# Patient Record
Sex: FEMALE | Race: BLACK OR AFRICAN AMERICAN | NOT HISPANIC OR LATINO | Employment: PART TIME | RURAL
[De-identification: names, ages, dates, MRNs, and addresses within clinical notes are randomized per-mention and may not be internally consistent; named-entity substitution may affect disease eponyms.]

---
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
## 2024-01-29 ENCOUNTER — ROUTINE PRENATAL (OUTPATIENT)
Dept: OBSTETRICS AND GYNECOLOGY | Facility: CLINIC | Age: 19
End: 2024-01-29
Payer: MEDICAID

## 2024-01-29 VITALS
BODY MASS INDEX: 23.34 KG/M2 | SYSTOLIC BLOOD PRESSURE: 120 MMHG | DIASTOLIC BLOOD PRESSURE: 88 MMHG | WEIGHT: 136 LBS | HEART RATE: 98 BPM

## 2024-01-29 DIAGNOSIS — Z34.82 ENCOUNTER FOR SUPERVISION OF NORMAL PREGNANCY IN MULTIGRAVIDA IN SECOND TRIMESTER: Primary | ICD-10-CM

## 2024-01-29 DIAGNOSIS — R35.0 URINARY FREQUENCY: ICD-10-CM

## 2024-01-29 DIAGNOSIS — Z3A.18 18 WEEKS GESTATION OF PREGNANCY: ICD-10-CM

## 2024-01-29 LAB
BILIRUB SERPL-MCNC: NORMAL MG/DL
BLOOD URINE, POC: NORMAL
COLOR, POC UA: NORMAL
GLUCOSE UR QL STRIP: NORMAL
KETONES UR QL STRIP: NORMAL
LEUKOCYTE ESTERASE URINE, POC: NORMAL
NITRITE, POC UA: NORMAL
PH, POC UA: 7
PROTEIN, POC: NORMAL
SPECIFIC GRAVITY, POC UA: 1.01
UROBILINOGEN, POC UA: 0.2

## 2024-01-29 PROCEDURE — 81003 URINALYSIS AUTO W/O SCOPE: CPT | Mod: PBBFAC | Performed by: OBSTETRICS & GYNECOLOGY

## 2024-01-29 PROCEDURE — 99213 OFFICE O/P EST LOW 20 MIN: CPT | Mod: S$PBB,TH,, | Performed by: OBSTETRICS & GYNECOLOGY

## 2024-01-29 PROCEDURE — 99213 OFFICE O/P EST LOW 20 MIN: CPT | Mod: PBBFAC | Performed by: OBSTETRICS & GYNECOLOGY

## 2024-01-29 PROCEDURE — 99999PBSHW POCT URINALYSIS W/O SCOPE: Mod: PBBFAC,,,

## 2024-01-29 NOTE — PROGRESS NOTES
18 y.o. female  at 18w0d   She c/o nothing today. She is doing well.   Reports some fetal movement or fluttering. Denies any vaginal bleeding, leakage of fluid, cramping, contractions, or pressure.   Total weight gain/weight loss in pregnancy: 2.268 kg (5 lb)     Vitals  BP: 120/88  Pulse: 98  Weight: 61.7 kg (136 lb)  Prenatal  Fetal Heart Rate: 145  Movement: Present  Edema  LLE Edema: None  RLE Edema: None  Facial: None  Additional Edema?: No    Prenatal Labs:  Lab Results   Component Value Date    GROUPTRH O POS 2023    HGB 11.4 (L) 2023    HCT 34.0 (L) 2023     2023    SICKLE Negative 2023    HEPBSAG Non-Reactive 2023    [id number] Non-Reactive 2023    LABNGO Negative 2023    LABURIN Skin/Urogenital Carol Isolated, no further workup. 2023    [id number] Negative 2021       A: 18w0d           ICD-10-CM ICD-9-CM    1. Encounter for supervision of normal pregnancy in multigravida in second trimester  Z34.82 V22.1       2. Urinary frequency  R35.0 788.41       3. 18 weeks gestation of pregnancy  Z3A.18 V22.2 POCT URINALYSIS W/O SCOPE          P: Bleeding, daily fetal kick counts, and  labor/labor precautions discussed.      Questions answered to desired level of satisfaction  Verbalized understanding to all information and instructions provided.  Follow up for 2 weeks anatomy US and 4 weeks YESSICA visit.

## 2024-02-12 ENCOUNTER — HOSPITAL ENCOUNTER (OUTPATIENT)
Dept: RADIOLOGY | Facility: HOSPITAL | Age: 19
Discharge: HOME | End: 2024-02-12
Attending: OBSTETRICS & GYNECOLOGY
Payer: MEDICAID

## 2024-02-12 DIAGNOSIS — Z36.89 SCREENING FOR PREGNANCY-ASSOCIATED PLASMA PROTEIN A: ICD-10-CM

## 2024-02-12 PROCEDURE — 76805 OB US >/= 14 WKS SNGL FETUS: CPT | Mod: 26,,, | Performed by: RADIOLOGY

## 2024-02-12 PROCEDURE — 76805 OB US >/= 14 WKS SNGL FETUS: CPT | Mod: TC

## 2024-02-13 ENCOUNTER — TELEPHONE (OUTPATIENT)
Dept: OBSTETRICS AND GYNECOLOGY | Facility: CLINIC | Age: 19
End: 2024-02-13
Payer: MEDICAID

## 2024-02-26 ENCOUNTER — ROUTINE PRENATAL (OUTPATIENT)
Dept: OBSTETRICS AND GYNECOLOGY | Facility: CLINIC | Age: 19
End: 2024-02-26
Payer: MEDICAID

## 2024-02-26 VITALS
DIASTOLIC BLOOD PRESSURE: 80 MMHG | BODY MASS INDEX: 23.69 KG/M2 | SYSTOLIC BLOOD PRESSURE: 120 MMHG | HEART RATE: 61 BPM | WEIGHT: 138 LBS

## 2024-02-26 DIAGNOSIS — O44.42 LOW LYING PLACENTA NOS OR WITHOUT HEMORRHAGE, SECOND TRIMESTER: ICD-10-CM

## 2024-02-26 DIAGNOSIS — R35.0 URINARY FREQUENCY: ICD-10-CM

## 2024-02-26 DIAGNOSIS — Z3A.22 22 WEEKS GESTATION OF PREGNANCY: Primary | ICD-10-CM

## 2024-02-26 DIAGNOSIS — R42 DIZZINESS: ICD-10-CM

## 2024-02-26 DIAGNOSIS — Z34.82 ENCOUNTER FOR SUPERVISION OF NORMAL PREGNANCY IN MULTIGRAVIDA IN SECOND TRIMESTER: ICD-10-CM

## 2024-02-26 LAB
BILIRUB SERPL-MCNC: NORMAL MG/DL
BLOOD URINE, POC: NORMAL
COLOR, POC UA: NORMAL
GLUCOSE UR QL STRIP: NORMAL
KETONES UR QL STRIP: NORMAL
LEUKOCYTE ESTERASE URINE, POC: NORMAL
NITRITE, POC UA: NORMAL
PH, POC UA: 7.5
PROTEIN, POC: NORMAL
SPECIFIC GRAVITY, POC UA: 1.01
UROBILINOGEN, POC UA: 0.2

## 2024-02-26 PROCEDURE — 99999PBSHW POCT URINALYSIS W/O SCOPE: Mod: PBBFAC,,,

## 2024-02-26 PROCEDURE — 59425 ANTEPARTUM CARE ONLY: CPT | Mod: S$PBB,TH,, | Performed by: OBSTETRICS & GYNECOLOGY

## 2024-02-26 PROCEDURE — 99213 OFFICE O/P EST LOW 20 MIN: CPT | Mod: PBBFAC | Performed by: OBSTETRICS & GYNECOLOGY

## 2024-02-26 PROCEDURE — 81003 URINALYSIS AUTO W/O SCOPE: CPT | Mod: PBBFAC | Performed by: OBSTETRICS & GYNECOLOGY

## 2024-02-26 NOTE — PROGRESS NOTES
18 y.o. female  at 22w0d   She c/o feeling dizziness on standing mainly at work during the day. She feels like her iron is low. She works at [employer] about 6 days a week at 9-10 hour shifts. She would like to reduce her work hours.  She is only drinking about 2-3 16 oz water bottles a day. Discussed increasing her water intake. She does admit to drinking a lot of sweet tea. Discussed drinking extra water with this.  Also increase protein intake and make sure to eat 3 meals a day.   Discussed dizziness may be a hydration issue and a glycemic issue.  Discussed anatomy US was wnl, but placental was low lying. Plan repeat US next visit  Reports good fetal movement or fluttering. Denies any vaginal bleeding, leakage of fluid, cramping, contractions, or pressure.   Total weight gain/weight loss in pregnancy: 3.175 kg (7 lb)     Vitals  BP: 120/80  Pulse: 61  Weight: 62.6 kg (138 lb)  Prenatal  Fundal Height (cm): 22 cm  Fetal Heart Rate: 140  Movement: Present  Edema  LLE Edema: None  RLE Edema: None  Facial: None  Additional Edema?: No    Prenatal Labs:  Lab Results   Component Value Date    GROUPTRH O POS 2023    HGB 11.4 (L) 2023    HCT 34.0 (L) 2023     2023    SICKLE Negative 2023    HEPBSAG Non-Reactive 2023    [id number] Non-Reactive 2023    LABNGO Negative 2023    LABURIN Skin/Urogenital Carol Isolated, no further workup. 2023    [id number] Negative 2021       A: 22w0d           ICD-10-CM ICD-9-CM    1. 22 weeks gestation of pregnancy  Z3A.22 V22.2 POCT URINALYSIS W/O SCOPE      2. Encounter for supervision of normal pregnancy in multigravida in second trimester  Z34.82 V22.1       3. Urinary frequency  R35.0 788.41 POCT URINALYSIS W/O SCOPE      4. Low lying placenta nos or without hemorrhage, second trimester  O44.42 641.03 US OB FU Ea Gestation Transabdominal      5. Dizziness  R42 780.4 CBC Auto Differential      Basic Metabolic  Panel          P: Bleeding, daily fetal kick counts, and  labor/labor precautions discussed.    Note given to work no more than 5 days a week and 6 hour shifts.  Questions answered to desired level of satisfaction  Verbalized understanding to all information and instructions provided.  Follow up in about 4 weeks (around 3/25/2024) for YESSICA and repeat US.

## 2024-03-01 ENCOUNTER — TELEPHONE (OUTPATIENT)
Dept: OBSTETRICS AND GYNECOLOGY | Facility: CLINIC | Age: 19
End: 2024-03-01
Payer: MEDICAID

## 2024-03-01 NOTE — TELEPHONE ENCOUNTER
----- Message from Maximo Penaloza MD sent at 2/29/2024  3:01 AM CST -----  Please call her and tell her that she is anemic and she can take an iron supplement over the counter (ferrous sulfate 325 mg daily).

## 2024-03-08 ENCOUNTER — HOSPITAL ENCOUNTER (EMERGENCY)
Facility: HOSPITAL | Age: 19
Discharge: HOME | End: 2024-03-08
Payer: MEDICAID

## 2024-03-08 VITALS
HEART RATE: 72 BPM | RESPIRATION RATE: 18 BRPM | HEIGHT: 64 IN | OXYGEN SATURATION: 100 % | TEMPERATURE: 99 F | WEIGHT: 145 LBS | BODY MASS INDEX: 24.75 KG/M2 | SYSTOLIC BLOOD PRESSURE: 119 MMHG | DIASTOLIC BLOOD PRESSURE: 73 MMHG

## 2024-03-08 DIAGNOSIS — N39.0 URINARY TRACT INFECTION WITH HEMATURIA, SITE UNSPECIFIED: ICD-10-CM

## 2024-03-08 DIAGNOSIS — E87.1 HYPONATREMIA: ICD-10-CM

## 2024-03-08 DIAGNOSIS — O46.90 VAGINAL BLEEDING IN PREGNANCY: Primary | ICD-10-CM

## 2024-03-08 DIAGNOSIS — R31.9 URINARY TRACT INFECTION WITH HEMATURIA, SITE UNSPECIFIED: ICD-10-CM

## 2024-03-08 DIAGNOSIS — Z86.2 HX OF IRON DEFICIENCY ANEMIA: ICD-10-CM

## 2024-03-08 DIAGNOSIS — A59.9 TRICHOMONAS INFECTION: ICD-10-CM

## 2024-03-08 LAB
ANION GAP SERPL CALCULATED.3IONS-SCNC: 11 MMOL/L (ref 7–16)
BASOPHILS # BLD AUTO: 0.04 K/UL (ref 0–0.2)
BASOPHILS NFR BLD AUTO: 0.4 % (ref 0–1)
BILIRUB UR QL STRIP: NEGATIVE
BUN SERPL-MCNC: 10 MG/DL (ref 7–18)
BUN/CREAT SERPL: 20 (ref 6–20)
CALCIUM SERPL-MCNC: 8.6 MG/DL (ref 8.5–10.1)
CHLORIDE SERPL-SCNC: 101 MMOL/L (ref 98–107)
CLARITY UR: ABNORMAL
CO2 SERPL-SCNC: 25 MMOL/L (ref 21–32)
COLOR UR: YELLOW
CREAT SERPL-MCNC: 0.5 MG/DL (ref 0.55–1.02)
DIFFERENTIAL METHOD BLD: ABNORMAL
EGFR (NO RACE VARIABLE) (RUSH/TITUS): 139 ML/MIN/1.73M2
EOSINOPHIL # BLD AUTO: 0.21 K/UL (ref 0–0.5)
EOSINOPHIL NFR BLD AUTO: 2.4 % (ref 1–4)
ERYTHROCYTE [DISTWIDTH] IN BLOOD BY AUTOMATED COUNT: 12.9 % (ref 11.5–14.5)
GLUCOSE SERPL-MCNC: 102 MG/DL (ref 74–106)
GLUCOSE UR STRIP-MCNC: NEGATIVE MG/DL
GROUP & RH: NORMAL
HCT VFR BLD AUTO: 28 % (ref 38–47)
HGB BLD-MCNC: 9 G/DL (ref 12–16)
INDIRECT COOMBS: NEGATIVE
KETONES UR STRIP-SCNC: NEGATIVE MG/DL
LEUKOCYTE ESTERASE UR QL STRIP: ABNORMAL
LYMPHOCYTES # BLD AUTO: 2.09 K/UL (ref 1–4.8)
LYMPHOCYTES NFR BLD AUTO: 23.5 % (ref 27–41)
MCH RBC QN AUTO: 28.5 PG (ref 27–31)
MCHC RBC AUTO-ENTMCNC: 32.1 G/DL (ref 32–36)
MCV RBC AUTO: 88.6 FL (ref 80–96)
MONOCYTES # BLD AUTO: 1.01 K/UL (ref 0–0.8)
MONOCYTES NFR BLD AUTO: 11.3 % (ref 2–6)
MPC BLD CALC-MCNC: 9.6 FL (ref 9.4–12.4)
MUCOUS THREADS #/AREA URNS HPF: ABNORMAL /HPF
NEUTROPHILS # BLD AUTO: 5.56 K/UL (ref 1.8–7.7)
NEUTROPHILS NFR BLD AUTO: 62.4 % (ref 53–65)
NITRITE UR QL STRIP: NEGATIVE
PH UR STRIP: 6.5 PH UNITS
PLATELET # BLD AUTO: 358 K/UL (ref 150–400)
POTASSIUM SERPL-SCNC: 3.6 MMOL/L (ref 3.5–5.1)
PROT UR QL STRIP: ABNORMAL
RBC # BLD AUTO: 3.16 M/UL (ref 4.2–5.4)
RBC # UR STRIP: ABNORMAL /UL
RBC #/AREA URNS HPF: ABNORMAL /HPF
SODIUM SERPL-SCNC: 133 MMOL/L (ref 136–145)
SP GR UR STRIP: >=1.03
SPECIMEN OUTDATE: NORMAL
SQUAMOUS #/AREA URNS LPF: ABNORMAL /LPF
TRICHOMONAS #/AREA URNS HPF: ABNORMAL /HPF
UROBILINOGEN UR STRIP-ACNC: 2 MG/DL
WBC # BLD AUTO: 8.91 K/UL (ref 4.5–11)
WBC #/AREA URNS HPF: ABNORMAL /HPF

## 2024-03-08 PROCEDURE — 80048 BASIC METABOLIC PNL TOTAL CA: CPT

## 2024-03-08 PROCEDURE — 96365 THER/PROPH/DIAG IV INF INIT: CPT

## 2024-03-08 PROCEDURE — 81003 URINALYSIS AUTO W/O SCOPE: CPT

## 2024-03-08 PROCEDURE — 99284 EMERGENCY DEPT VISIT MOD MDM: CPT | Mod: ,,,

## 2024-03-08 PROCEDURE — 99284 EMERGENCY DEPT VISIT MOD MDM: CPT | Mod: 25

## 2024-03-08 PROCEDURE — 87086 URINE CULTURE/COLONY COUNT: CPT

## 2024-03-08 PROCEDURE — 63600175 PHARM REV CODE 636 W HCPCS

## 2024-03-08 PROCEDURE — 85025 COMPLETE CBC W/AUTO DIFF WBC: CPT

## 2024-03-08 PROCEDURE — 86900 BLOOD TYPING SEROLOGIC ABO: CPT

## 2024-03-08 PROCEDURE — 25000003 PHARM REV CODE 250

## 2024-03-08 PROCEDURE — 86901 BLOOD TYPING SEROLOGIC RH(D): CPT

## 2024-03-08 PROCEDURE — 86850 RBC ANTIBODY SCREEN: CPT

## 2024-03-08 RX ORDER — CEPHALEXIN 500 MG/1
500 CAPSULE ORAL 4 TIMES DAILY
Qty: 28 CAPSULE | Refills: 0 | Status: SHIPPED | OUTPATIENT
Start: 2024-03-08 | End: 2024-03-15

## 2024-03-08 RX ORDER — METRONIDAZOLE 500 MG/1
500 TABLET ORAL EVERY 12 HOURS
Qty: 14 TABLET | Refills: 0 | Status: SHIPPED | OUTPATIENT
Start: 2024-03-08 | End: 2024-03-15

## 2024-03-08 RX ORDER — VITAMIN- MINERAL OMEGA-3 SUPPLEMENT 53.5; 38; 25; 1; 2; 3; 1.8; 5; 25; 300; 12.5; 2; 5; 10; 2 MG/1; MG/1; MG/1; MG/1; MG/1; MG/1; MG/1; MG/1; MG/1; UG/1; UG/1; MG/1; MG/1; MG/1; MG/1
1 CAPSULE, LIQUID FILLED ORAL
COMMUNITY
Start: 2023-11-08

## 2024-03-08 RX ADMIN — SODIUM CHLORIDE 1000 ML: 9 INJECTION, SOLUTION INTRAVENOUS at 04:03

## 2024-03-08 RX ADMIN — CEFTRIAXONE SODIUM 1 G: 1 INJECTION, POWDER, FOR SOLUTION INTRAMUSCULAR; INTRAVENOUS at 04:03

## 2024-03-08 NOTE — DISCHARGE INSTRUCTIONS
Take medication as prescribed.  Recommend taking your prenatal vitamins/iron as previously directed.  Your urine today does show a urinary tract infection and Trichomonas infection.  We recommend taking all antibiotics even if you feel better.  We also recommend increasing fluids to maintain proper hydration.  We recommend that you contact your OB specialist on Monday morning and arrange a follow up appointment 1-2 days for a recheck.  Return to the emergency department for abdominal pain, return of vaginal bleeding, decreased fetal movement, or any other new or worrisome symptoms.

## 2024-03-08 NOTE — Clinical Note
"Dilia MOSQUERA" Iman was seen and treated in our emergency department on 3/8/2024.  She may return to work on 03/11/2024.       If you have any questions or concerns, please don't hesitate to call.      Gold Wayne, FNP"

## 2024-03-08 NOTE — ED PROVIDER NOTES
Encounter Date: 3/8/2024       History     Chief Complaint   Patient presents with    Vaginal Bleeding     Patient comes in states that she went on break at 250pm and noticed panty liner soaked with blood, patient is 23weeks pregnant per her reports, Denies abdominal cramping, denies pain, denies trauma, denies any unusual activity, denies any urinary discomfort     Patient is a 18-year-old black female who presents to emergency department today with complaints of bright red bleeding that started approximately 20 minutes prior to arrival.  She reports that she was approximately 23 weeks pregnant.  She denies any injury, abdominal pain, vomiting, dizziness, syncope, lightheadedness, or any other complaints.  Her only reported past medical history is iron-deficiency and she reports intermittent compliance with her medication.  Also reports that she has not taking daily prenatal vitamins but that she did take 1 this morning.  Currently following with Dr. Penaloza at [facility].  Does report increased stress recently at work but reports that she has not been lifting any heavy objects.  She reports that the bleeding was bright red but denies any clots.  Reports no change in fetal movement and reports the baby has been active today.  Fetal heart tones 150.  She appears in no acute distress.    The history is provided by the patient.     Review of patient's allergies indicates:  No Known Allergies  No past medical history on file.  Past Surgical History:   Procedure Laterality Date     SECTION  2021     Family History   Problem Relation Age of Onset    Leukemia Sister     Diabetes Sister      Social History     Tobacco Use    Smoking status: Never    Smokeless tobacco: Never   Substance Use Topics    Alcohol use: Never    Drug use: Not Currently     Review of Systems   Constitutional:  Negative for activity change, appetite change, chills and fever.   HENT:  Negative for congestion and sore throat.    Eyes:  Negative.    Respiratory:  Negative for cough and shortness of breath.    Cardiovascular:  Negative for chest pain.   Gastrointestinal:  Negative for abdominal pain, diarrhea, nausea and vomiting.        Pregnant abdomen   Endocrine: Negative.    Genitourinary:  Positive for vaginal bleeding. Negative for dysuria and vaginal discharge.   Musculoskeletal:  Negative for arthralgias, back pain, neck pain and neck stiffness.   Skin:  Negative for color change, pallor and rash.   Allergic/Immunologic: Negative.    Neurological:  Negative for dizziness, syncope, speech difficulty, weakness, light-headedness and headaches.   Hematological:  Does not bruise/bleed easily.   Psychiatric/Behavioral:  Negative for confusion. The patient is not nervous/anxious.    All other systems reviewed and are negative.      Physical Exam     Initial Vitals [03/08/24 1525]   BP Pulse Resp Temp SpO2   116/73 96 18 98.8 °F (37.1 °C) 100 %      MAP       --         Physical Exam    Nursing note and vitals reviewed.  Constitutional: She appears well-developed and well-nourished. She is not diaphoretic. No distress.   HENT:   Head: Normocephalic and atraumatic.   Mouth/Throat: Oropharynx is clear and moist.   Eyes: Conjunctivae and EOM are normal. Pupils are equal, round, and reactive to light.   Neck: Neck supple.   Normal range of motion.  Cardiovascular:  Normal rate, regular rhythm and normal heart sounds.           Pulmonary/Chest: Breath sounds normal. No respiratory distress. She has no wheezes. She has no rhonchi. She has no rales. She exhibits no tenderness.   Abdominal: Abdomen is soft. Bowel sounds are normal. She exhibits no distension. There is no abdominal tenderness. There is no rebound and no guarding.   Genitourinary:    Genitourinary Comments: No obvious active bleeding at this time.     Musculoskeletal:         General: Normal range of motion.      Cervical back: Normal range of motion and neck supple.     Neurological: She is  alert and oriented to person, place, and time. She has normal strength. GCS score is 15. GCS eye subscore is 4. GCS verbal subscore is 5. GCS motor subscore is 6.   Skin: Skin is warm and dry. Capillary refill takes less than 2 seconds.   Psychiatric: She has a normal mood and affect. Her behavior is normal. Judgment and thought content normal.         Medical Screening Exam   See Full Note    ED Course   Procedures  Labs Reviewed   URINALYSIS, REFLEX TO URINE CULTURE - Abnormal; Notable for the following components:       Result Value    Leukocytes, UA Trace (*)     Protein, UA Trace (*)     Urobilinogen, UA 2.0 (*)     Blood, UA Trace-Intact (*)     Specific Gravity, UA >=1.030 (*)     All other components within normal limits   CBC WITH DIFFERENTIAL - Abnormal; Notable for the following components:    RBC 3.16 (*)     Hemoglobin 9.0 (*)     Hematocrit 28.0 (*)     Lymphocytes % 23.5 (*)     Monocytes % 11.3 (*)     Monocytes, Absolute 1.01 (*)     All other components within normal limits   BASIC METABOLIC PANEL - Abnormal; Notable for the following components:    Sodium 133 (*)     Creatinine 0.50 (*)     All other components within normal limits   URINALYSIS, MICROSCOPIC - Abnormal; Notable for the following components:    WBC, UA 5-10 (*)     Squamous Epithelial Cells, UA Few (*)     Mucus, UA Moderate (*)     Trichomonas, UA Few (*)     All other components within normal limits   CULTURE, URINE   CBC W/ AUTO DIFFERENTIAL    Narrative:     The following orders were created for panel order CBC auto differential.  Procedure                               Abnormality         Status                     ---------                               -----------         ------                     CBC with Differential[[id number]]       Abnormal            Final result                 Please view results for these tests on the individual orders.   TYPE & SCREEN          Imaging Results    None          Medications   sodium  chloride 0.9% bolus 1,000 mL 1,000 mL (0 mLs Intravenous Stopped 3/8/24 1650)   cefTRIAXone (Rocephin) 1 g in dextrose 5 % in water (D5W) 100 mL IVPB (MB+) (0 g Intravenous Stopped 3/8/24 1650)     Medical Decision Making  Patient presents to the emergency department with complaints of vaginal bleeding in pregnancy.  Approximate 23 weeks pregnant per patient.  Estimated due date 07/01/2024.  She was alert and oriented x4.  GCS 15.  Denies any symptoms of anemia such as weakness, dizziness, lightheadedness, or any other complaints.  Denies any abdominal pain.  Fetal heart tones 150.  We will obtain baseline labs and catheterized urine sample for further evaluation.  H&H is stable today.  Urinalysis does show urinary tract infection with Trichomonas.  Sodium was 133.  We did provide her with normal saline 1 L bolus IV along with Rocephin 1 g IV for treatment of urinary tract infection.  We did contact Dr. Mcghee who is on-call with OB hospital medicine at [facility] through the use of [facility].  Case was discussed in detail.  Patient also reports that she has had no further bleeding since her arrival here in the ED. continues to deny any abdominal pain, nausea, or any other complaints.  This was all explained to Dr. Mcghee in detail.  He recommends no further workup at this time.  We did discuss disposition in her home with antibiotics for urinary tract infection and we will also treat the Trichomonas.  He did mentioned that she can presented any time if her symptoms change or worsen.  Otherwise she should contact her OB specialist on Monday and obtain a follow up in 1-2 days.  This information was discussed in detail with the patient.  She verbalizes understanding is in agreement.      Amount and/or Complexity of Data Reviewed  Independent Historian:      Details: Patient is a 18-year-old black female who presents to emergency department today with complaints of bright red bleeding that started approximately 20 minutes  prior to arrival.  She reports that she was approximately 23 weeks pregnant.  She denies any injury, abdominal pain, vomiting, dizziness, syncope, lightheadedness, or any other complaints.  Her only reported past medical history is iron-deficiency and she reports intermittent compliance with her medication.  Also reports that she has not taking daily prenatal vitamins but that she did take 1 this morning.  Currently following with Dr. Penaloza at [facility].  Does report increased stress recently at work but reports that she has not been lifting any heavy objects.  She reports that the bleeding was bright red but denies any clots.  Reports no change in fetal movement and reports the baby has been active today.  Fetal heart tones 150.  She appears in no acute distress.  External Data Reviewed: labs.     Details: Hemoglobin 9.4 and hematocrit 28 reviewed from labs 11 days prior.  Labs: ordered.     Details: CBC shows a WBC 8.91, hemoglobin of 9, hematocrit 28, and platelet count 358.  BMP shows a sodium of 133, creatinine of 0.5, and otherwise unremarkable.  Urinalysis shows trace leukocytes, trace protein, urobilinogen 2.0, trace blood, and specific gravity greater than 1.030.    Risk  Risk Details: Patient presents for emergent evaluation of acute vaginal bleeding and pregnancy that poses a threat to life and/or bodily function.    Final diagnoses:  [O46.90] Vaginal bleeding in pregnancy (Primary)  [Z86.2] Hx of iron deficiency anemia  [N39.0, R31.9] Urinary tract infection with hematuria, site unspecified   I ordered labs and personally reviewed them.  Labs significant for stable H&H compared to prior labs 11 days ago.  Findings were discussed in detail with Dr. Mcghee and the patient.  See above for further details.    Patient was managed in the ED with IV normal saline and Rocephin.  The response to treatment was improved.  No bleeding while here in the ED.  Patient was discharged in stable condition.  Detailed  return precautions discussed.                                       Clinical Impression:   Final diagnoses:  [O46.90] Vaginal bleeding in pregnancy (Primary)  [Z86.2] Hx of iron deficiency anemia  [N39.0, R31.9] Urinary tract infection with hematuria, site unspecified               [place], [facility]  03/08/24 4016

## 2024-03-11 LAB — UA COMPLETE W REFLEX CULTURE PNL UR: NO GROWTH

## 2024-03-13 NOTE — ADDENDUM NOTE
Encounter addended by: Narda Harrington on: 3/13/2024 10:52 AM   Actions taken: SmartForm saved, Flowsheet accepted, Charge Capture section accepted

## 2024-03-25 ENCOUNTER — ROUTINE PRENATAL (OUTPATIENT)
Dept: OBSTETRICS AND GYNECOLOGY | Facility: CLINIC | Age: 19
End: 2024-03-25
Payer: MEDICAID

## 2024-03-25 ENCOUNTER — HOSPITAL ENCOUNTER (OUTPATIENT)
Dept: RADIOLOGY | Facility: HOSPITAL | Age: 19
Discharge: HOME | End: 2024-03-25
Attending: OBSTETRICS & GYNECOLOGY
Payer: MEDICAID

## 2024-03-25 VITALS
BODY MASS INDEX: 24.72 KG/M2 | SYSTOLIC BLOOD PRESSURE: 120 MMHG | DIASTOLIC BLOOD PRESSURE: 80 MMHG | HEART RATE: 87 BPM | WEIGHT: 144 LBS

## 2024-03-25 DIAGNOSIS — O44.42 LOW LYING PLACENTA NOS OR WITHOUT HEMORRHAGE, SECOND TRIMESTER: ICD-10-CM

## 2024-03-25 DIAGNOSIS — R35.0 URINARY FREQUENCY: ICD-10-CM

## 2024-03-25 DIAGNOSIS — Z86.19 HISTORY OF CHLAMYDIA: ICD-10-CM

## 2024-03-25 DIAGNOSIS — Z3A.26 26 WEEKS GESTATION OF PREGNANCY: ICD-10-CM

## 2024-03-25 DIAGNOSIS — Z34.82 ENCOUNTER FOR SUPERVISION OF NORMAL PREGNANCY IN MULTIGRAVIDA IN SECOND TRIMESTER: Primary | ICD-10-CM

## 2024-03-25 PROCEDURE — 87491 CHLMYD TRACH DNA AMP PROBE: CPT | Mod: ,,, | Performed by: CLINICAL MEDICAL LABORATORY

## 2024-03-25 PROCEDURE — 76805 OB US >/= 14 WKS SNGL FETUS: CPT | Mod: TC

## 2024-03-25 PROCEDURE — 99213 OFFICE O/P EST LOW 20 MIN: CPT | Mod: PBBFAC,25 | Performed by: OBSTETRICS & GYNECOLOGY

## 2024-03-25 PROCEDURE — 76805 OB US >/= 14 WKS SNGL FETUS: CPT | Mod: 26,,, | Performed by: RADIOLOGY

## 2024-03-25 PROCEDURE — 87591 N.GONORRHOEAE DNA AMP PROB: CPT | Mod: ,,, | Performed by: CLINICAL MEDICAL LABORATORY

## 2024-03-25 PROCEDURE — 87661 TRICHOMONAS VAGINALIS AMPLIF: CPT | Mod: ,,, | Performed by: CLINICAL MEDICAL LABORATORY

## 2024-03-25 PROCEDURE — 59425 ANTEPARTUM CARE ONLY: CPT | Mod: S$PBB,TH,, | Performed by: OBSTETRICS & GYNECOLOGY

## 2024-03-25 NOTE — PROGRESS NOTES
18 y.o. female  at 26w0d   She c/o nothing today. She is feeling well. She went to the ER for VB a few weeks ago, but this has since resolved.  She had an US today that was wnl. Placenta is no longer low lying and is 6.5 cm away from the cervical os. Growth is wnl. Breech presentation.   Reports good fetal movement or fluttering. Denies any vaginal bleeding, leakage of fluid, cramping, contractions, or pressure.   Total weight gain/weight loss in pregnancy: 5.897 kg (13 lb)     Vitals  BP: 120/80  Pulse: 87  Weight: 65.3 kg (144 lb)  Prenatal  Fetal Heart Rate: 163  Movement: Present  Edema  LLE Edema: None  RLE Edema: None  Facial: None  Additional Edema?: No  Cervical Exam  Presentation: Aidan Breech    Prenatal Labs:  Lab Results   Component Value Date    GROUPTRH O Positive 2024    INDCOGEL Negative 2024    HGB 9.0 (L) 2024    HCT 28.0 (L) 2024     2024    SICKLE Negative 2023    HEPBSAG Non-Reactive 2023    [id number] Non-Reactive 2023    LABNGO Negative 2023    LABURIN No Growth 2024    [id number] Negative 2021       A: 26w0d           ICD-10-CM ICD-9-CM    1. Encounter for supervision of normal pregnancy in multigravida in second trimester  Z34.82 V22.1 Treponema Pallidum (Syphillis) Antibody      CBC Auto Differential      Glucose, 1Hr Post Prandial      HIV 1/2 Ag/Ab (4th Gen)      2. Urinary frequency  R35.0 788.41       3. 26 weeks gestation of pregnancy  Z3A.26 V22.2       4. History of chlamydia  Z86.19 V12.09 Chlamydia/GC, PCR      Trichomonas vaginalis by PCR          P: Bleeding, daily fetal kick counts, and  labor/labor precautions discussed.      Questions answered to desired level of satisfaction  Verbalized understanding to all information and instructions provided.  Follow up in about 2 weeks (around 2024) for YESSICA visit.  Complete 28 wk labs at that time.  Test of cure for chlamydia  today.

## 2024-03-26 ENCOUNTER — TELEPHONE (OUTPATIENT)
Dept: OBSTETRICS AND GYNECOLOGY | Facility: CLINIC | Age: 19
End: 2024-03-26
Payer: MEDICAID

## 2024-03-26 DIAGNOSIS — A59.01 TRICHOMONAS VAGINITIS: Primary | ICD-10-CM

## 2024-03-26 LAB
CHLAMYDIA BY PCR: NEGATIVE
N. GONORRHOEAE (GC) BY PCR: NEGATIVE
TRICHOMONAS NAT: POSITIVE

## 2024-03-26 RX ORDER — METRONIDAZOLE 500 MG/1
500 TABLET ORAL EVERY 12 HOURS
Qty: 14 TABLET | Refills: 0 | Status: SHIPPED | OUTPATIENT
Start: 2024-03-26 | End: 2024-04-02

## 2024-03-26 NOTE — TELEPHONE ENCOUNTER
----- Message from Maximo Penaloza MD sent at 3/26/2024  8:07 AM CDT -----  Please call her and tell her that she is still positive for Trichomonas. I'm sending Metronidazole to her pharmacy. Please make sure that her partner was treated. If not, he needs to be treated and they need to refrain from sex  x 7 days after they are both treated. It is an STD.

## 2024-04-15 ENCOUNTER — ROUTINE PRENATAL (OUTPATIENT)
Dept: OBSTETRICS AND GYNECOLOGY | Facility: CLINIC | Age: 19
End: 2024-04-15
Payer: MEDICAID

## 2024-04-15 VITALS
DIASTOLIC BLOOD PRESSURE: 66 MMHG | WEIGHT: 144.81 LBS | HEART RATE: 115 BPM | SYSTOLIC BLOOD PRESSURE: 101 MMHG | BODY MASS INDEX: 24.85 KG/M2

## 2024-04-15 DIAGNOSIS — Z3A.29 29 WEEKS GESTATION OF PREGNANCY: Primary | ICD-10-CM

## 2024-04-15 DIAGNOSIS — Z34.83 ENCOUNTER FOR SUPERVISION OF NORMAL PREGNANCY IN MULTIGRAVIDA IN THIRD TRIMESTER: ICD-10-CM

## 2024-04-15 PROCEDURE — 90715 TDAP VACCINE 7 YRS/> IM: CPT | Mod: PBBFAC | Performed by: OBSTETRICS & GYNECOLOGY

## 2024-04-15 PROCEDURE — 99213 OFFICE O/P EST LOW 20 MIN: CPT | Mod: PBBFAC | Performed by: OBSTETRICS & GYNECOLOGY

## 2024-04-15 PROCEDURE — 59425 ANTEPARTUM CARE ONLY: CPT | Mod: S$PBB,TH,, | Performed by: OBSTETRICS & GYNECOLOGY

## 2024-04-15 PROCEDURE — 81003 URINALYSIS AUTO W/O SCOPE: CPT | Mod: PBBFAC | Performed by: OBSTETRICS & GYNECOLOGY

## 2024-04-15 PROCEDURE — 99999PBSHW PR PBB SHADOW TECHNICAL ONLY FILED TO HB: Mod: PBBFAC,,,

## 2024-04-15 PROCEDURE — 99999PBSHW POCT URINALYSIS W/O SCOPE: Mod: PBBFAC,,,

## 2024-04-15 PROCEDURE — 90471 IMMUNIZATION ADMIN: CPT | Mod: PBBFAC | Performed by: OBSTETRICS & GYNECOLOGY

## 2024-04-15 RX ADMIN — TETANUS TOXOID, REDUCED DIPHTHERIA TOXOID AND ACELLULAR PERTUSSIS VACCINE, ADSORBED 0.5 ML: 5; 2.5; 8; 8; 2.5 SUSPENSION INTRAMUSCULAR at 02:04

## 2024-04-15 NOTE — PROGRESS NOTES
19 y.o. female  at 29w0d   She c/o feeling more tired and having intermittent contractions after working. No other complaints. She has not completed her 28 wk labs yet. This was strongly encouraged.  Reports good fetal movement or fluttering. Denies any vaginal bleeding, leakage of fluid, cramping, contractions, or pressure.   Total weight gain/weight loss in pregnancy: 6.26 kg (13 lb 12.8 oz)     Vitals  BP: 101/66  Pulse: (!) 115  Weight: 65.7 kg (144 lb 12.8 oz)  Prenatal  Fundal Height (cm): 27 cm  Fetal Heart Rate: 145  Movement: Present  Edema  LLE Edema: None  RLE Edema: None  Facial: None  Additional Edema?: No    Prenatal Labs:  Lab Results   Component Value Date    GROUPTRH O Positive 2024    INDCOGEL Negative 2024    HGB 9.0 (L) 2024    HCT 28.0 (L) 2024     2024    SICKLE Negative 2023    HEPBSAG Non-Reactive 2023    [id number] Non-Reactive 2023    LABNGO Negative 2024    LABURIN No Growth 2024    [id number] Negative 2021       A: 29w0d           ICD-10-CM ICD-9-CM    1. 29 weeks gestation of pregnancy  Z3A.29 V22.2 POCT URINALYSIS W/O SCOPE      2. Encounter for supervision of normal pregnancy in multigravida in third trimester  Z34.83 V22.1 Tdap (BOOSTRIX) vaccine injection 0.5 mL          P: Bleeding, daily fetal kick counts, and  labor/labor precautions discussed.      Questions answered to desired level of satisfaction  Verbalized understanding to all information and instructions provided.  Follow up in about 2 weeks (around 2024) for RAMIREZ

## 2024-04-22 ENCOUNTER — HOSPITAL ENCOUNTER (EMERGENCY)
Facility: HOSPITAL | Age: 19
Discharge: HOME | End: 2024-04-22
Attending: OBSTETRICS & GYNECOLOGY
Payer: MEDICAID

## 2024-04-22 VITALS
TEMPERATURE: 98 F | OXYGEN SATURATION: 98 % | RESPIRATION RATE: 20 BRPM | SYSTOLIC BLOOD PRESSURE: 125 MMHG | BODY MASS INDEX: 24.85 KG/M2 | HEART RATE: 92 BPM | DIASTOLIC BLOOD PRESSURE: 76 MMHG | HEIGHT: 64 IN

## 2024-04-22 DIAGNOSIS — O26.893 ABDOMINAL PAIN DURING PREGNANCY IN THIRD TRIMESTER: Primary | ICD-10-CM

## 2024-04-22 DIAGNOSIS — Z3A.30 30 WEEKS GESTATION OF PREGNANCY: ICD-10-CM

## 2024-04-22 DIAGNOSIS — R10.9 ABDOMINAL PAIN DURING PREGNANCY IN THIRD TRIMESTER: Primary | ICD-10-CM

## 2024-04-22 DIAGNOSIS — N94.9 ROUND LIGAMENT PAIN: ICD-10-CM

## 2024-04-22 PROBLEM — O26.899 PAIN OF ROUND LIGAMENT AFFECTING PREGNANCY, ANTEPARTUM: Status: ACTIVE | Noted: 2024-04-22

## 2024-04-22 PROBLEM — R10.2 PAIN OF ROUND LIGAMENT AFFECTING PREGNANCY, ANTEPARTUM: Status: ACTIVE | Noted: 2024-04-22

## 2024-04-22 LAB
BACTERIA #/AREA URNS HPF: ABNORMAL /HPF
BILIRUB UR QL STRIP: NEGATIVE
CLARITY UR: CLEAR
COLOR UR: YELLOW
GLUCOSE UR STRIP-MCNC: NORMAL MG/DL
KETONES UR STRIP-SCNC: NEGATIVE MG/DL
LEUKOCYTE ESTERASE UR QL STRIP: ABNORMAL
MUCOUS, UA: ABNORMAL /LPF
NITRITE UR QL STRIP: NEGATIVE
PH UR STRIP: 6.5 PH UNITS
PROT UR QL STRIP: 30
RBC # UR STRIP: NEGATIVE /UL
RBC #/AREA URNS HPF: 6 /HPF
SP GR UR STRIP: 1.02
SQUAMOUS #/AREA URNS LPF: ABNORMAL /HPF
UROBILINOGEN UR STRIP-ACNC: 2 MG/DL
WBC #/AREA URNS HPF: 5 /HPF

## 2024-04-22 PROCEDURE — 81003 URINALYSIS AUTO W/O SCOPE: CPT | Performed by: OBSTETRICS & GYNECOLOGY

## 2024-04-22 PROCEDURE — 25000003 PHARM REV CODE 250: Performed by: OBSTETRICS & GYNECOLOGY

## 2024-04-22 PROCEDURE — 59025 FETAL NON-STRESS TEST: CPT

## 2024-04-22 PROCEDURE — 99284 EMERGENCY DEPT VISIT MOD MDM: CPT | Mod: 25

## 2024-04-22 RX ORDER — ACETAMINOPHEN 500 MG
1000 TABLET ORAL ONCE
Status: COMPLETED | OUTPATIENT
Start: 2024-04-22 | End: 2024-04-22

## 2024-04-22 RX ADMIN — ACETAMINOPHEN 1000 MG: 500 TABLET ORAL at 05:04

## 2024-04-22 NOTE — ED PROVIDER NOTES
Encounter Date: 2024    SCARLET Physician: Zia Moon   Primary OBGYN:Dr. Penaloza    Admit Diagnosis/Chief Complaint: Abdominal Pain and pelvic pressure when standing  History     Chief Complaint   Patient presents with    Abdominal Cramping     Patient is a 19-year-old  who presents at 30 weeks and 0 days with complaints of abdominal pain and pelvic pressure when standing at work.  She reports she is works at [employer] and stands for 8 hours at a time.  She states that after about 3 or 4 hours the pelvic pressure and lower abdominal cramping gets to the point where she is extremely uncomfortable.  The lower abdominal pain description is consistent with round ligament discomfort and is relieved by sitting or lying down.  She does not have a maternity support belt and she was strongly encouraged to purchase one.   The correct use of the belt was described in detail.  She was also encouraged to take Tylenol to extra-strength every 4-6 hours as needed for her discomfort.        Obstetric HPI:  Patient reports None contractions, active fetal movement, absent vaginal bleeding , absent loss of fluid      Objective:     Vital Signs (Most Recent):  Temp: 97.9 °F (36.6 °C) (24 1641)  Pulse: 92 (24 1641)  Resp: 20 (24 1641)  BP: 125/76 (24 1641)  SpO2: 98 % (24 164) Vital Signs (24h Range):  Temp:  [97.9 °F (36.6 °C)] 97.9 °F (36.6 °C)  Pulse:  [92] 92  Resp:  [20] 20  SpO2:  [98 %] 98 %  BP: (125)/(76) 125/76        Body mass index is 24.85 kg/m².    FHT: 135  Cat 1 (reassuring)  TOCO:  No contractions noted    No intake or output data in the 24 hours ending 24 1753    Cervical Exam: Per Nurse  Dilation:  0  Effacement:  0%  Station: -3  Presentation: Vertex     Significant Labs:  Recent Lab Results         24  1633        Appearance, UA Clear       Bacteria, UA Few       Bilirubin (UA) Negative       Color, UA Yellow       Glucose, UA Normal       Ketones, UA Negative        Leukocyte Esterase, UA Large       Mucous Many       NITRITE UA Negative       Blood, UA Negative       pH, UA 6.5       Protein, UA 30       RBC, UA 6       Specific [place], UA 1.024       Squamous Epithelial Cells, UA Occasional       UROBILINOGEN UA 2       WBC, UA 5             Review of patient's allergies indicates:  No Known Allergies  No past medical history on file.  Past Surgical History:   Procedure Laterality Date     SECTION  2021     Family History   Problem Relation Name Age of Onset    Leukemia Sister      Diabetes Sister       Social History     Tobacco Use    Smoking status: Never    Smokeless tobacco: Never   Substance Use Topics    Alcohol use: Never    Drug use: Not Currently     Review of Systems   Constitutional:  Negative for appetite change, chills, fatigue and fever.   HENT:  Negative for congestion.    Eyes:  Negative for visual disturbance.   Respiratory:  Negative for cough, chest tightness and shortness of breath.    Cardiovascular:  Negative for chest pain, palpitations and leg swelling.   Gastrointestinal:  Positive for abdominal pain. Negative for abdominal distention, blood in stool, constipation, diarrhea, nausea and vomiting.   Endocrine: Negative for cold intolerance, heat intolerance, polydipsia, polyphagia and polyuria.   Genitourinary:  Negative for difficulty urinating, dyspareunia, dysuria, flank pain, frequency, pelvic pain, urgency, vaginal bleeding, vaginal discharge and vaginal pain.   Musculoskeletal:  Negative for arthralgias, back pain and myalgias.   Skin: Negative.    Neurological:  Negative for headaches.   Psychiatric/Behavioral: Negative.  Negative for agitation, behavioral problems and confusion.        Physical Exam     Initial Vitals [24 1641]   BP Pulse Resp Temp SpO2   125/76 92 20 97.9 °F (36.6 °C) 98 %      MAP       --         Physical Exam    Nursing note and vitals reviewed.  Constitutional: She appears well-developed and  well-nourished. No distress.   HENT:   Head: Normocephalic and atraumatic.   Nose: Nose normal.   Eyes: EOM are normal. Pupils are equal, round, and reactive to light.   Neck:   Normal range of motion.  Cardiovascular:  Normal rate.           Pulmonary/Chest: No respiratory distress.   Abdominal: Abdomen is soft. There is no abdominal tenderness. There is no rebound and no guarding.   Musculoskeletal:         General: No edema.      Cervical back: Normal range of motion.     Neurological: She is alert and oriented to person, place, and time.   Skin: Skin is warm and dry.   Psychiatric: She has a normal mood and affect. Thought content normal.         ED Course     Labs Reviewed   URINALYSIS, REFLEX TO URINE CULTURE - Abnormal; Notable for the following components:       Result Value    Leukocytes, UA Large (*)     Protein, UA 30 (*)     Urobilinogen, UA 2 (*)     All other components within normal limits   URINALYSIS, MICROSCOPIC - Abnormal; Notable for the following components:    RBC, UA 6 (*)     Bacteria, UA Few (*)     Squamous Epithelial Cells, UA Occasional (*)     Mucous Many (*)     All other components within normal limits        Imaging Results    None          Medical Decision Making  19-year-old  presents at 30 weeks 0 days with complaints of pelvic pressure and lower quadrants discomfort after standing for more than 3-4 hours at work.  She denies uterine contractions her cervix is closed thick and high.  Nonstress test is reactive displacement of her uterus medially reproduces her discomfort consistent with round ligament pain.  Patient was encouraged to obtain and utilize a maternity support belt.  She will follow-up with Dr. Penaloza on 2024.    Amount and/or Complexity of Data Reviewed  Labs: ordered.       Medications   acetaminophen tablet 1,000 mg (1,000 mg Oral Given 24)                 ED Course as of 24 1753   Mon 2024   1701 19-year-old  presents at 30 weeks  and 0 days with complaints of lower abdominal pain and pressure when standing.  She denies uterine contractions and no contractions are noted on the monitor.  She is reactive nonstress test with baseline of 140 category 1.  Urinalysis is negative for nitrites ketones bilirubin and blood.  Thirty of protein and large leukocyte esterase.  She has a history of  chlamydia with a recent negative test of  cure .  She is closed thick and high.  Her next appointment with Dr. Penaloza is 04/30/2024. [JA]      ED Course User Index  [JA] Zia Moon MD                 Clinical Impression:   Final diagnoses:  [Z3A.30] 30 weeks gestation of pregnancy  [O26.893, R10.9] Abdominal pain during pregnancy in third trimester (Primary)  [N94.9] Round ligament pain        ED Disposition Condition    Discharge           ED Prescriptions    None       Follow-up Information       Follow up With Specialties Details Why Contact Info    Maximo Penaloza MD Obstetrics and Gynecology On 4/30/2024  [address] [facility] [address]  [phone]               Zia Moon MD  04/22/24 9953

## 2024-04-22 NOTE — Clinical Note
"Dilia MOSQUERA" Iman was seen and treated in our emergency department on 4/22/2024.  She may return to work on 04/24/2024.       If you have any questions or concerns, please don't hesitate to call.      Zia Moon MD"

## 2024-04-22 NOTE — ED NOTES
Notified Dr. Moon of pt arrival et pt c/o abd cramping for past several days. Pt denies vaginal bleeding or leakage. Pt states + fetal movement. Water jug filled et  given to pt. Instructed pt to increase oral hydration. Pt voiced understanding.

## 2024-04-22 NOTE — Clinical Note
"Dilia MOSQUERA" Iman was seen and treated in our emergency department on 4/22/2024.  She may return to work on 04/23/2024.       If you have any questions or concerns, please don't hesitate to call.      Zia Moon MD"

## 2024-04-22 NOTE — DISCHARGE INSTRUCTIONS
Maternity support belt at all times except when showering or sleeping.    Tylenol 2 extra-strength by mouth every 4-6 hours as needed for pain

## 2024-04-22 NOTE — ED NOTES
Pt requesting work excuse for tomorrow. Notified Dr. Moon of pt. Request. Dr. Moon states is ok for pt to be off work tomorrow.

## 2024-04-22 NOTE — Clinical Note
"Dilia MOSQUERA" Iman was seen and treated in our emergency department on 4/22/2024.  She may return to work on 04/24/2024.       If you have any questions or concerns, please don't hesitate to call.       RN    "

## 2024-04-30 ENCOUNTER — ROUTINE PRENATAL (OUTPATIENT)
Dept: OBSTETRICS AND GYNECOLOGY | Facility: CLINIC | Age: 19
End: 2024-04-30
Payer: MEDICAID

## 2024-04-30 VITALS
SYSTOLIC BLOOD PRESSURE: 119 MMHG | DIASTOLIC BLOOD PRESSURE: 68 MMHG | HEART RATE: 82 BPM | BODY MASS INDEX: 23.76 KG/M2 | WEIGHT: 138.38 LBS

## 2024-04-30 DIAGNOSIS — A59.01 TRICHOMONAS VAGINITIS: ICD-10-CM

## 2024-04-30 DIAGNOSIS — Z3A.31 31 WEEKS GESTATION OF PREGNANCY: Primary | ICD-10-CM

## 2024-04-30 DIAGNOSIS — Z34.83 ENCOUNTER FOR SUPERVISION OF NORMAL PREGNANCY IN MULTIGRAVIDA IN THIRD TRIMESTER: ICD-10-CM

## 2024-04-30 PROCEDURE — 99213 OFFICE O/P EST LOW 20 MIN: CPT | Mod: PBBFAC | Performed by: OBSTETRICS & GYNECOLOGY

## 2024-04-30 PROCEDURE — 87661 TRICHOMONAS VAGINALIS AMPLIF: CPT | Mod: ,,, | Performed by: CLINICAL MEDICAL LABORATORY

## 2024-04-30 PROCEDURE — 59426 ANTEPARTUM CARE ONLY: CPT | Mod: S$PBB,TH,, | Performed by: OBSTETRICS & GYNECOLOGY

## 2024-04-30 NOTE — PROGRESS NOTES
19 y.o. female  at 31w1d   She c/o some back pain and not feeling well at work. She is wearing a maternity belt that is helping at home, but not helping that much at work.   She did complete the 28 wk labs today.  Plan to discuss TDaP next visit.  Reports good fetal movement or fluttering. Denies any vaginal bleeding, leakage of fluid, cramping, contractions, or pressure.   Total weight gain/weight loss in pregnancy: 3.357 kg (7 lb 6.4 oz)     Vitals  BP: 119/68  Pulse: 82  Weight: 62.8 kg (138 lb 6.4 oz)  Prenatal  Fundal Height (cm): 30 cm  Fetal Heart Rate: 145  Movement: Present  Edema  LLE Edema: None  RLE Edema: None  Facial: None  Additional Edema?: No    Prenatal Labs:  Lab Results   Component Value Date    GROUPTRH O Positive 2024    INDCOGEL Negative 2024    HGB 9.0 (L) 2024    HCT 28.0 (L) 2024     2024    SICKLE Negative 2023    HEPBSAG Non-Reactive 2023    [id number] Non-Reactive 2023    LABNGO Negative 2024    LABURIN No Growth 2024    [id number] Negative 2021       A: 31w1d           ICD-10-CM ICD-9-CM    1. 31 weeks gestation of pregnancy  Z3A.31 V22.2 POCT URINALYSIS W/O SCOPE      2. Encounter for supervision of normal pregnancy in multigravida in third trimester  Z34.83 V22.1       3. Trichomonas vaginitis  A59.01 131.01           P: Bleeding, daily fetal kick counts, and  labor/labor precautions discussed.      Questions answered to desired level of satisfaction  Verbalized understanding to all information and instructions provided.  Follow up in about 2 weeks (around 2024) for YESSICA visit.

## 2024-05-01 ENCOUNTER — TELEPHONE (OUTPATIENT)
Dept: OBSTETRICS AND GYNECOLOGY | Facility: CLINIC | Age: 19
End: 2024-05-01
Payer: MEDICAID

## 2024-05-01 DIAGNOSIS — D50.8 OTHER IRON DEFICIENCY ANEMIA: Primary | ICD-10-CM

## 2024-05-01 RX ORDER — FERROUS SULFATE 325(65) MG
325 TABLET ORAL 2 TIMES DAILY
Qty: 60 TABLET | Refills: 6 | Status: SHIPPED | OUTPATIENT
Start: 2024-05-01

## 2024-05-01 NOTE — TELEPHONE ENCOUNTER
Spoke with pt about results, additional labwork, rx and possible transfusion; pt voiced understanding.       ----- Message from Maximo Penaloza MD sent at 5/1/2024  7:14 AM CDT -----  Please call her and tell her that she passed her glucose test. However, she is very anemic. Is she taking her iron supplement? It is so low that she needs iron transfusions. And I'm increasing her iron supplements to twice a day. Can you stop by the lab to do iron studies? And then I'll refer her for the iron transfusion once the labs are resulted.

## 2024-05-02 ENCOUNTER — TELEPHONE (OUTPATIENT)
Dept: OBSTETRICS AND GYNECOLOGY | Facility: CLINIC | Age: 19
End: 2024-05-02
Payer: MEDICAID

## 2024-05-02 LAB — TRICHOMONAS NAT: POSITIVE

## 2024-05-02 NOTE — TELEPHONE ENCOUNTER
Left message for pt to return call to clinic.       ----- Message from Johnna Ascencio sent at 5/2/2024  9:20 AM CDT -----  PATIENT IS CALLING TO SEE ARE YOU GOING TO CALL ME SOME MEDICINE, FOR WHAT GOING ON WITH HER CALL BACK NUMBER [phone]

## 2024-05-03 ENCOUNTER — TELEPHONE (OUTPATIENT)
Dept: OBSTETRICS AND GYNECOLOGY | Facility: CLINIC | Age: 19
End: 2024-05-03
Payer: MEDICAID

## 2024-05-03 DIAGNOSIS — A59.01 TRICHOMONAS VAGINITIS: Primary | ICD-10-CM

## 2024-05-03 RX ORDER — METRONIDAZOLE 500 MG/1
500 TABLET ORAL EVERY 12 HOURS
Qty: 14 TABLET | Refills: 0 | Status: SHIPPED | OUTPATIENT
Start: 2024-05-03 | End: 2024-05-10

## 2024-05-03 NOTE — TELEPHONE ENCOUNTER
----- Message from Maximo Penaloza MD sent at 5/3/2024  9:57 AM CDT -----  Please call her and tell her that she still has trichomonas. I'm sending her Metronidazole again. Her partner needs to get treated again and they need to refrain from sex x 7 days after they are both treated.

## 2024-05-16 ENCOUNTER — HOSPITAL ENCOUNTER (EMERGENCY)
Facility: HOSPITAL | Age: 19
Discharge: HOME | End: 2024-05-16
Attending: OBSTETRICS & GYNECOLOGY
Payer: MEDICAID

## 2024-05-16 ENCOUNTER — TELEPHONE (OUTPATIENT)
Dept: OBSTETRICS AND GYNECOLOGY | Facility: CLINIC | Age: 19
End: 2024-05-16
Payer: MEDICAID

## 2024-05-16 VITALS
RESPIRATION RATE: 18 BRPM | BODY MASS INDEX: 24.1 KG/M2 | HEIGHT: 64 IN | WEIGHT: 141.19 LBS | SYSTOLIC BLOOD PRESSURE: 125 MMHG | DIASTOLIC BLOOD PRESSURE: 74 MMHG | HEART RATE: 77 BPM | TEMPERATURE: 98 F | OXYGEN SATURATION: 99 %

## 2024-05-16 DIAGNOSIS — A59.01 TRICHOMONAS VAGINITIS: ICD-10-CM

## 2024-05-16 DIAGNOSIS — O26.893 ABDOMINAL PAIN DURING PREGNANCY IN THIRD TRIMESTER: Primary | ICD-10-CM

## 2024-05-16 DIAGNOSIS — R10.9 ABDOMINAL PAIN DURING PREGNANCY IN THIRD TRIMESTER: Primary | ICD-10-CM

## 2024-05-16 DIAGNOSIS — Z98.891 HISTORY OF CESAREAN SECTION: ICD-10-CM

## 2024-05-16 DIAGNOSIS — Z3A.33 33 WEEKS GESTATION OF PREGNANCY: ICD-10-CM

## 2024-05-16 LAB
BACTERIA #/AREA URNS HPF: ABNORMAL /HPF
BILIRUB UR QL STRIP: NEGATIVE
CLARITY UR: CLEAR
COLOR UR: ABNORMAL
GLUCOSE UR STRIP-MCNC: NORMAL MG/DL
KETONES UR STRIP-SCNC: NEGATIVE MG/DL
LEUKOCYTE ESTERASE UR QL STRIP: ABNORMAL
MUCOUS, UA: ABNORMAL /LPF
NITRITE UR QL STRIP: NEGATIVE
PH UR STRIP: 7 PH UNITS
PROT UR QL STRIP: NEGATIVE
RBC # UR STRIP: ABNORMAL /UL
RBC #/AREA URNS HPF: 4 /HPF
SP GR UR STRIP: 1.01
SQUAMOUS #/AREA URNS LPF: ABNORMAL /HPF
TRICHOMONAS #/AREA URNS HPF: ABNORMAL /HPF
UROBILINOGEN UR STRIP-ACNC: NORMAL MG/DL
WBC #/AREA URNS HPF: 14 /HPF

## 2024-05-16 PROCEDURE — 25000003 PHARM REV CODE 250: Performed by: OBSTETRICS & GYNECOLOGY

## 2024-05-16 PROCEDURE — 81003 URINALYSIS AUTO W/O SCOPE: CPT | Performed by: OBSTETRICS & GYNECOLOGY

## 2024-05-16 PROCEDURE — 99284 EMERGENCY DEPT VISIT MOD MDM: CPT

## 2024-05-16 PROCEDURE — 87086 URINE CULTURE/COLONY COUNT: CPT | Performed by: OBSTETRICS & GYNECOLOGY

## 2024-05-16 RX ORDER — ONDANSETRON 4 MG/1
4 TABLET, ORALLY DISINTEGRATING ORAL ONCE
Status: COMPLETED | OUTPATIENT
Start: 2024-05-16 | End: 2024-05-16

## 2024-05-16 RX ADMIN — ONDANSETRON 4 MG: 4 TABLET, ORALLY DISINTEGRATING ORAL at 08:05

## 2024-05-16 NOTE — TELEPHONE ENCOUNTER
----- Message from Simran Young sent at 5/16/2024 10:10 AM CDT -----  Pt has appt on Mon at 315. She wants to come that morning. No appts available. Call [phone].

## 2024-05-17 NOTE — ED PROVIDER NOTES
"Encounter Date: 2024    [facility] Physician: OSCAR TYSON   Primary OBGYN:Dr. Penaloza    History     Chief Complaint   Patient presents with    Abdominal Cramping     18 yo , at 33 weeks 3 days gestation, presents to the [facility] "just for a checkup". Patient was here at the hospital visiting a hospitalized family member and decided to stop by the [facility], stating that she has been having lower abdominal cramping for the past several days. Reports that the cramping pain is constant and is worse with certain movements. States pain does not feel like contraction pain. No vaginal bleeding. No LOF. No urinary complaints. Baby has been active. Patient sees Dr Penaloza for prenatal care and has an upcoming appointment with her on Monday, May 20th.  OB hx is pertinent for "emergency " at 35 weeks-patient states she was told the placenta had stopped growing.  Prenatal history with this pregnancy:  Positive chlamydia 2023, treated with negative ADAN.  Positive trichomonas 3/8, 3/25, and again -states she is taking metronidazole currently for a trichomonas infection.  Anemia, on iron  Low lying posterior placenta, resolved on followup scan      Medical history: denies any chronic or serious illnesses.  Surgical history:  section  Allergies: None  Medications: iron, PNV, metronidazole      OB History       2    Para   1    Term   0       1    AB        Living   1      SAB        IAB        Ectopic        Multiple        Live Births   1          # Outcome Date GA Labor/2nd Weight Sex Type Anes PTL Lv A1 A5   2 Current                 1   35w0d    CS-LTranv Spinal  Living           Obstetric Comments    C/S--35 weeks--IUGR, failed IOL          Review of patient's allergies indicates:  No Known Allergies  No past medical history on file.  Past Surgical History:   Procedure Laterality Date     SECTION  2021     Family History   Problem Relation Name Age of Onset    " Leukemia Sister      Diabetes Sister       Social History     Tobacco Use    Smoking status: Never    Smokeless tobacco: Never   Substance Use Topics    Alcohol use: Never    Drug use: Not Currently     Review of Systems  REVIEW OF SYSTEMS  Constitutional:  No fever, no chills.  Eyes:  No visual complaints/disturbances.  Cardiovascular:  No chest pain, no history of heart disease.  Respiratory:  No cough, no shortness of breath.  Gastrointestinal:  + nausea, no vomiting. Patient did not initially mention nausea but did report on ROS. No vomiting.   Genitourinary:  No dysuria, no hematuria.    Physical Exam     Initial Vitals [05/16/24 1927]   BP Pulse Resp Temp SpO2   125/74 77 18 98.1 °F (36.7 °C) 99 %      MAP       --            Physical Exam  PHYSICAL EXAMINATION  Afebrile, vital signs stable.  General: WD, WN female patient, alert and oriented to person, place, and time. Patient does not appear to be in any discomfort.  Skin: Skin is warm and dry, no jaundice.  HENT:  Normocephalic, atraumatic.  Eyes:  Pupils are equal and round, extraocular movements are intact.  Respiratory:  Symmetrical chest wall expansion, breathing unlabored, no respiratory distress.  Cardiovascular:  Normal heart rate, good peripheral perfusion.  Gastrointestinal:  Abdomen is soft and nontender.  Gravid uterus noted.  Neurologic: No focal deficits noted.  Psychiatric: Normal mood, appropriate affect.    OB Examination  Uterus:  Enlarged, nontender.  FHR:  FHR baseline is normal. There is moderate variability with accelerations noted.  There are no FHR decelerations.  FHR strip is Category 1.  [place]:  Contractions: None  Cervix: thick/closed/posterior      ED Course     Labs Reviewed   URINALYSIS, REFLEX TO URINE CULTURE - Abnormal; Notable for the following components:       Result Value    Leukocytes, UA Large (*)     Blood, UA Trace (*)     All other components within normal limits   URINALYSIS, MICROSCOPIC - Abnormal; Notable for the  following components:    WBC, UA 14 (*)     RBC, UA 4 (*)     Bacteria, UA Moderate (*)     Squamous Epithelial Cells, UA Occasional (*)     Trichomonas, UA Occasional (*)     Mucous Occasional (*)     All other components within normal limits   CULTURE, URINE        Imaging Results    None          Medical Decision Making  Risk  Prescription drug management.       Medications   ondansetron disintegrating tablet 4 mg (has no administration in time range)      UA with occasional trichomonas, negative nitrite, negative ketones, moderate bacteria, occasional epithelials, 4 rbc, 14 wbc, urine reflexed to culture.  Patient had + trich 3/8, 3/25 and again . States she is currently taking metronidazole.               I have reviewed patient's prenatal records. Patient is having no contractions. Her cervix is thick and closed. FHTs are category 1. Patient is being discharged to keep her appointment with Dr Penaloza in 4 days.  Patient was given one dose of Zofran in the [facility]. She is feeling better at this time and desires discharge home.         Clinical Impression:   Final diagnoses:  [O26.893, R10.9] Abdominal pain during pregnancy in third trimester (Primary)  [A59.01] Trichomonas vaginitis  [Z98.891] History of  section  [Z3A.33] 33 weeks gestation of pregnancy   History of FGR with first pregnancy, failed induction at 35 weeks,  section at [facility] in   .  Discharge  See Dr Penaloza 2024  Urine culture results are pending.   labor precautions are given.   ED Disposition Condition    Discharge Stable          ED Prescriptions    None       Follow-up Information    None          Keshav Hyde MD  [id number]       Keshav Hyde MD  [id number]

## 2024-05-18 LAB — UA COMPLETE W REFLEX CULTURE PNL UR: NORMAL

## 2024-05-20 ENCOUNTER — TELEPHONE (OUTPATIENT)
Dept: OBSTETRICS AND GYNECOLOGY | Facility: CLINIC | Age: 19
End: 2024-05-20
Payer: MEDICAID

## 2024-05-20 NOTE — TELEPHONE ENCOUNTER
Spoke with pt about results but also explained that Dr. Penaloza will test again at a later appt to see if it has cleared.         ----- Message from aMximo Penaloza MD sent at 5/18/2024  6:00 AM CDT -----  Please call her and tell her that she has trichomonas in her urine. She was recently treated for this. If she completed her treatment, then we will just retest her in June as planned. Otherwise her urine culture is negative.

## 2024-05-21 ENCOUNTER — TELEPHONE (OUTPATIENT)
Dept: OBSTETRICS AND GYNECOLOGY | Facility: CLINIC | Age: 19
End: 2024-05-21
Payer: MEDICAID

## 2024-05-21 NOTE — TELEPHONE ENCOUNTER
Spoke with pt about concerns; pt stated in pain and thinks she's leaking fluids. Advised to go to L&D ER. Pt voiced understanding.     ----- Message from Simran Young sent at 5/21/2024 12:40 PM CDT -----  Who Called: Dilia Valerio    Patient is returning phone call    Who Left Message for Patient:patient  Does the patient know what this is regarding? 34 Week OB pt is not feeling well at all. Hurting under stomach and feels faint.      Preferred Method of Contact: Phone Call  Patient's Preferred Phone Number on File: [phone]   Best Call Back Number, if different:  Additional Information:

## 2024-05-24 NOTE — PROGRESS NOTES
Return OB Visit    19 y.o. female  at 34w4d   She c/o some increased lower abdominal cramping. She denies any other complaints. She is using a maternity belt and this is helping some.   Discussed her anemia again. She is taking her iron supplements. She did not get the anemia w/u done in April. Repeat CBC and anemia w/u today. Discussed order iron transfusions if needed. She is still taking the second round of Metronidazole. She cannot swallow the pills so has to break them in half. She has not been sexually active as her partner is working off [employer].  She finished the first round late and had not finished it prior to the last test of cure and had been with her partner during that time as well.     Reports good fetal movement or fluttering. Denies any vaginal bleeding, leakage of fluid, cramping, contractions, or pressure.   Total weight gain/weight loss in pregnancy: 5.171 kg (11 lb 6.4 oz)     Vitals  BP: 116/68  Pulse: 96  Weight: 64.6 kg (142 lb 6.4 oz)  Prenatal  Fundal Height (cm): 34 cm  Fetal Heart Rate: 150  Movement: Present  Edema  LLE Edema: None  RLE Edema: None  Facial: None  Additional Edema?: No    Prenatal Labs:  Lab Results   Component Value Date    GROUPTRH O Positive 2024    INDCOGEL Negative 2024    HGB 8.0 (L) 2024    HCT 27.2 (L) 2024     2024    SICKLE Negative 2023    HEPBSAG Non-Reactive 2023    [id number] Non-Reactive 2024    LABNGO Negative 2024    LABURIN Skin/Urogenital Craol Isolated, no further workup. 2024    [id number] Negative 2021       A: 34w4d           ICD-10-CM ICD-9-CM    1. Trichomonas vaginitis  A59.01 131.01 Trichomonas vaginalis by PCR      2. 35 weeks gestation of pregnancy  Z3A.35 V22.2 POCT URINALYSIS W/O SCOPE      3. Encounter for supervision of normal pregnancy in multigravida in third trimester  Z34.83 V22.1 US OB FU Ea Gestation Transabdominal      4. Anemia, unspecified type  D64.9  285.9 CBC Auto Differential          No pregnancy checklist tasks were completed during this visit, and no tasks are pending completion.    -Benefits of breastfeeding   -Rooming In and Skin to Skin    P: Bleeding, daily fetal kick counts, and  labor/labor precautions discussed.    Questions answered to desired level of satisfaction  Verbalized understanding to all information and instructions provided.  Test of cure today.  Follow up in about 1 week (around 2024) for YESSICA visit and growth US.  Plan GBS next visit as well.    Maximo Penaloza MD

## 2024-05-28 ENCOUNTER — ROUTINE PRENATAL (OUTPATIENT)
Dept: OBSTETRICS AND GYNECOLOGY | Facility: CLINIC | Age: 19
End: 2024-05-28
Payer: MEDICAID

## 2024-05-28 VITALS
WEIGHT: 142.38 LBS | BODY MASS INDEX: 24.44 KG/M2 | HEART RATE: 96 BPM | SYSTOLIC BLOOD PRESSURE: 116 MMHG | DIASTOLIC BLOOD PRESSURE: 68 MMHG

## 2024-05-28 DIAGNOSIS — D64.9 ANEMIA, UNSPECIFIED TYPE: ICD-10-CM

## 2024-05-28 DIAGNOSIS — Z34.83 ENCOUNTER FOR SUPERVISION OF NORMAL PREGNANCY IN MULTIGRAVIDA IN THIRD TRIMESTER: ICD-10-CM

## 2024-05-28 DIAGNOSIS — A59.01 TRICHOMONAS VAGINITIS: Primary | ICD-10-CM

## 2024-05-28 DIAGNOSIS — Z3A.35 35 WEEKS GESTATION OF PREGNANCY: ICD-10-CM

## 2024-05-28 LAB
BILIRUB SERPL-MCNC: NORMAL MG/DL
BLOOD URINE, POC: NORMAL
COLOR, POC UA: YELLOW
GLUCOSE UR QL STRIP: NORMAL
KETONES UR QL STRIP: NORMAL
LEUKOCYTE ESTERASE URINE, POC: NORMAL
NITRITE, POC UA: NORMAL
PH, POC UA: 6.5
PROTEIN, POC: NORMAL
SPECIFIC GRAVITY, POC UA: 1.01
UROBILINOGEN, POC UA: 0.2

## 2024-05-28 PROCEDURE — 59426 ANTEPARTUM CARE ONLY: CPT | Mod: S$PBB,TH,, | Performed by: OBSTETRICS & GYNECOLOGY

## 2024-05-28 PROCEDURE — 81003 URINALYSIS AUTO W/O SCOPE: CPT | Mod: PBBFAC | Performed by: OBSTETRICS & GYNECOLOGY

## 2024-05-28 PROCEDURE — 87661 TRICHOMONAS VAGINALIS AMPLIF: CPT | Mod: ,,, | Performed by: CLINICAL MEDICAL LABORATORY

## 2024-05-28 PROCEDURE — 99999PBSHW POCT URINALYSIS W/O SCOPE: Mod: PBBFAC,,,

## 2024-05-28 PROCEDURE — 99213 OFFICE O/P EST LOW 20 MIN: CPT | Mod: PBBFAC | Performed by: OBSTETRICS & GYNECOLOGY

## 2024-05-29 ENCOUNTER — TELEPHONE (OUTPATIENT)
Dept: OBSTETRICS AND GYNECOLOGY | Facility: CLINIC | Age: 19
End: 2024-05-29
Payer: MEDICAID

## 2024-05-29 DIAGNOSIS — A59.01 TRICHOMONAS VAGINITIS: Primary | ICD-10-CM

## 2024-05-29 DIAGNOSIS — D50.9 IRON DEFICIENCY ANEMIA DURING PREGNANCY: Primary | ICD-10-CM

## 2024-05-29 DIAGNOSIS — O99.019 IRON DEFICIENCY ANEMIA DURING PREGNANCY: Primary | ICD-10-CM

## 2024-05-29 DIAGNOSIS — A59.01 TRICHOMONAS VAGINITIS: ICD-10-CM

## 2024-05-29 LAB — TRICHOMONAS NAT: POSITIVE

## 2024-05-29 RX ORDER — DIPHENHYDRAMINE HYDROCHLORIDE 50 MG/ML
50 INJECTION INTRAMUSCULAR; INTRAVENOUS ONCE AS NEEDED
OUTPATIENT
Start: 2024-05-29

## 2024-05-29 RX ORDER — SODIUM CHLORIDE 0.9 % (FLUSH) 0.9 %
10 SYRINGE (ML) INJECTION
Status: CANCELLED | OUTPATIENT
Start: 2024-05-29

## 2024-05-29 RX ORDER — SODIUM CHLORIDE 9 MG/ML
INJECTION, SOLUTION INTRAVENOUS CONTINUOUS
Status: CANCELLED | OUTPATIENT
Start: 2024-05-29

## 2024-05-29 RX ORDER — EPINEPHRINE 0.3 MG/.3ML
0.3 INJECTION SUBCUTANEOUS ONCE AS NEEDED
OUTPATIENT
Start: 2024-05-29

## 2024-05-29 RX ORDER — HEPARIN 100 UNIT/ML
500 SYRINGE INTRAVENOUS
Status: CANCELLED | OUTPATIENT
Start: 2024-05-29

## 2024-05-29 RX ORDER — HEPARIN 100 UNIT/ML
5 SYRINGE INTRAVENOUS
Status: CANCELLED | OUTPATIENT
Start: 2024-05-29

## 2024-05-29 RX ORDER — METRONIDAZOLE 500 MG/100ML
1000 INJECTION, SOLUTION INTRAVENOUS DAILY
Status: CANCELLED
Start: 2024-05-29

## 2024-05-29 NOTE — TELEPHONE ENCOUNTER
Spoke with pt about the referral and that they will reach out to her with an appt; pt voiced understanding.

## 2024-05-29 NOTE — PROGRESS NOTES
She cannot tolerate oral Metronidazole. Therefore, IV Metronidazole 1000 mg daily x 2 doses ordered.

## 2024-06-03 NOTE — PROGRESS NOTES
Return OB Visit    19 y.o. female  at 36w0d   She c/o some intermittent contractions. Desires repeat C/S.   US: vertex, pl post, GUNNAR 14 cm, EFW 6 lbs 3 oz (43%).   GBS performed.   Reports good fetal movement or fluttering. Denies any vaginal bleeding, leakage of fluid, cramping, contractions, or pressure.   Total weight gain/weight loss in pregnancy: 4.536 kg (10 lb)     Vitals  BP: 117/71  Pulse: 78  Weight: 64 kg (141 lb)  Prenatal  Fetal Heart Rate: 138  Movement: Present  Edema  LLE Edema: None  RLE Edema: None  Facial: None  Additional Edema?: No  Cervical Exam  Presentation: Vertex    Prenatal Labs:  Lab Results   Component Value Date    GROUPTRH O Positive 2024    INDCOGEL Negative 2024    HGB 8.1 (L) 2024    HGB 8.0 (L) 2024    HCT 28.6 (L) 2024    HCT 27.2 (L) 2024     2024    SICKLE Negative 2023    HEPBSAG Non-Reactive 2023    [id number] Non-Reactive 2024    LABNGO Negative 2024    LABURIN Skin/Urogenital Carol Isolated, no further workup. 2024    [id number] Negative 2021       A: 36w0d           ICD-10-CM ICD-9-CM    1. 36 weeks gestation of pregnancy  Z3A.36 V22.2 POCT URINALYSIS W/O SCOPE      Culture, Group B Strep      2. Encounter for supervision of normal pregnancy in multigravida in third trimester  Z34.83 V22.1       3. History of  section  Z98.891 V45.89       4. Iron deficiency anemia during pregnancy  O99.019 648.20     D50.9 280.9       5. Trichomonas vaginitis  A59.01 131.01           No pregnancy checklist tasks were completed during this visit, and no tasks are pending completion.    -Benefits of breastfeeding   -Rooming In and Skin to Skin    P: Bleeding, daily fetal kick counts, and  labor/labor precautions discussed.    Questions answered to desired level of satisfaction  Verbalized understanding to all information and instructions provided.  Follow up in about 1 week (around  6/11/2024) for YESSICA visit.    Maximo Penaloza MD

## 2024-06-04 ENCOUNTER — ROUTINE PRENATAL (OUTPATIENT)
Dept: OBSTETRICS AND GYNECOLOGY | Facility: CLINIC | Age: 19
End: 2024-06-04
Payer: MEDICAID

## 2024-06-04 ENCOUNTER — HOSPITAL ENCOUNTER (OUTPATIENT)
Dept: RADIOLOGY | Facility: HOSPITAL | Age: 19
Discharge: HOME | End: 2024-06-04
Attending: OBSTETRICS & GYNECOLOGY
Payer: MEDICAID

## 2024-06-04 VITALS
HEART RATE: 78 BPM | BODY MASS INDEX: 24.2 KG/M2 | WEIGHT: 141 LBS | SYSTOLIC BLOOD PRESSURE: 117 MMHG | DIASTOLIC BLOOD PRESSURE: 71 MMHG

## 2024-06-04 DIAGNOSIS — A59.01 TRICHOMONAS VAGINITIS: ICD-10-CM

## 2024-06-04 DIAGNOSIS — Z34.83 ENCOUNTER FOR SUPERVISION OF NORMAL PREGNANCY IN MULTIGRAVIDA IN THIRD TRIMESTER: ICD-10-CM

## 2024-06-04 DIAGNOSIS — D50.9 IRON DEFICIENCY ANEMIA DURING PREGNANCY: ICD-10-CM

## 2024-06-04 DIAGNOSIS — Z3A.36 36 WEEKS GESTATION OF PREGNANCY: Primary | ICD-10-CM

## 2024-06-04 DIAGNOSIS — Z98.891 HISTORY OF CESAREAN SECTION: ICD-10-CM

## 2024-06-04 DIAGNOSIS — O99.019 IRON DEFICIENCY ANEMIA DURING PREGNANCY: ICD-10-CM

## 2024-06-04 LAB
BILIRUB SERPL-MCNC: NORMAL MG/DL
BLOOD URINE, POC: NORMAL
COLOR, POC UA: NORMAL
GLUCOSE UR QL STRIP: NORMAL
KETONES UR QL STRIP: NORMAL
LEUKOCYTE ESTERASE URINE, POC: NORMAL
NITRITE, POC UA: NORMAL
PH, POC UA: 6.5
PROTEIN, POC: NORMAL
SPECIFIC GRAVITY, POC UA: 1.01
UROBILINOGEN, POC UA: 0.2

## 2024-06-04 PROCEDURE — 59426 ANTEPARTUM CARE ONLY: CPT | Mod: S$PBB,TH,, | Performed by: OBSTETRICS & GYNECOLOGY

## 2024-06-04 PROCEDURE — 81003 URINALYSIS AUTO W/O SCOPE: CPT | Mod: PBBFAC | Performed by: OBSTETRICS & GYNECOLOGY

## 2024-06-04 PROCEDURE — 87653 STREP B DNA AMP PROBE: CPT | Mod: ,,, | Performed by: CLINICAL MEDICAL LABORATORY

## 2024-06-04 PROCEDURE — 99213 OFFICE O/P EST LOW 20 MIN: CPT | Mod: PBBFAC,25 | Performed by: OBSTETRICS & GYNECOLOGY

## 2024-06-04 PROCEDURE — 99999PBSHW POCT URINALYSIS W/O SCOPE: Mod: PBBFAC,,,

## 2024-06-04 PROCEDURE — 76816 OB US FOLLOW-UP PER FETUS: CPT | Mod: TC

## 2024-06-04 PROCEDURE — 76816 OB US FOLLOW-UP PER FETUS: CPT | Mod: 26,,, | Performed by: RADIOLOGY

## 2024-06-05 ENCOUNTER — INFUSION (OUTPATIENT)
Dept: INFUSION THERAPY | Facility: HOSPITAL | Age: 19
End: 2024-06-05
Attending: OBSTETRICS & GYNECOLOGY
Payer: MEDICAID

## 2024-06-05 VITALS
DIASTOLIC BLOOD PRESSURE: 55 MMHG | BODY MASS INDEX: 24.2 KG/M2 | SYSTOLIC BLOOD PRESSURE: 100 MMHG | WEIGHT: 141 LBS | OXYGEN SATURATION: 99 % | HEART RATE: 84 BPM

## 2024-06-05 DIAGNOSIS — D50.9 IRON DEFICIENCY ANEMIA DURING PREGNANCY: ICD-10-CM

## 2024-06-05 DIAGNOSIS — O99.019 IRON DEFICIENCY ANEMIA DURING PREGNANCY: ICD-10-CM

## 2024-06-05 DIAGNOSIS — A59.01 TRICHOMONAS VAGINITIS: Primary | ICD-10-CM

## 2024-06-05 DIAGNOSIS — Z98.891 HISTORY OF CESAREAN SECTION: Primary | ICD-10-CM

## 2024-06-05 PROCEDURE — 96365 THER/PROPH/DIAG IV INF INIT: CPT

## 2024-06-05 PROCEDURE — 63600175 PHARM REV CODE 636 W HCPCS: Mod: UD | Performed by: OBSTETRICS & GYNECOLOGY

## 2024-06-05 RX ORDER — CARBOPROST TROMETHAMINE 250 UG/ML
250 INJECTION, SOLUTION INTRAMUSCULAR
OUTPATIENT
Start: 2024-06-05

## 2024-06-05 RX ORDER — MUPIROCIN 20 MG/G
OINTMENT TOPICAL
OUTPATIENT
Start: 2024-06-05

## 2024-06-05 RX ORDER — OXYTOCIN/RINGER'S LACTATE 30/500 ML
95 PLASTIC BAG, INJECTION (ML) INTRAVENOUS ONCE
OUTPATIENT
Start: 2024-06-05 | End: 2024-06-05

## 2024-06-05 RX ORDER — MISOPROSTOL 200 UG/1
800 TABLET ORAL
OUTPATIENT
Start: 2024-06-05

## 2024-06-05 RX ORDER — METRONIDAZOLE 500 MG/100ML
1000 INJECTION, SOLUTION INTRAVENOUS DAILY
Status: DISCONTINUED | OUTPATIENT
Start: 2024-06-05 | End: 2024-06-05 | Stop reason: HOSPADM

## 2024-06-05 RX ORDER — FAMOTIDINE 10 MG/ML
20 INJECTION INTRAVENOUS
OUTPATIENT
Start: 2024-06-05

## 2024-06-05 RX ORDER — CEFAZOLIN SODIUM 2 G/50ML
2 SOLUTION INTRAVENOUS
OUTPATIENT
Start: 2024-06-05

## 2024-06-05 RX ORDER — SODIUM CHLORIDE 0.9 % (FLUSH) 0.9 %
10 SYRINGE (ML) INJECTION
OUTPATIENT
Start: 2024-06-06

## 2024-06-05 RX ORDER — METHYLERGONOVINE MALEATE 0.2 MG/ML
200 INJECTION INTRAVENOUS
OUTPATIENT
Start: 2024-06-05

## 2024-06-05 RX ORDER — HEPARIN 100 UNIT/ML
500 SYRINGE INTRAVENOUS
OUTPATIENT
Start: 2024-06-06

## 2024-06-05 RX ORDER — METRONIDAZOLE 500 MG/100ML
1000 INJECTION, SOLUTION INTRAVENOUS DAILY
Status: CANCELLED
Start: 2024-06-06

## 2024-06-05 RX ORDER — SODIUM CHLORIDE 0.9 % (FLUSH) 0.9 %
10 SYRINGE (ML) INJECTION
Status: DISCONTINUED | OUTPATIENT
Start: 2024-06-05 | End: 2024-06-05 | Stop reason: HOSPADM

## 2024-06-05 RX ORDER — SODIUM CHLORIDE, SODIUM LACTATE, POTASSIUM CHLORIDE, CALCIUM CHLORIDE 600; 310; 30; 20 MG/100ML; MG/100ML; MG/100ML; MG/100ML
INJECTION, SOLUTION INTRAVENOUS CONTINUOUS
OUTPATIENT
Start: 2024-06-05

## 2024-06-05 RX ORDER — OXYTOCIN/RINGER'S LACTATE 30/500 ML
334 PLASTIC BAG, INJECTION (ML) INTRAVENOUS ONCE
OUTPATIENT
Start: 2024-06-05 | End: 2024-06-05

## 2024-06-05 RX ORDER — SODIUM CITRATE AND CITRIC ACID MONOHYDRATE 334; 500 MG/5ML; MG/5ML
30 SOLUTION ORAL
OUTPATIENT
Start: 2024-06-05

## 2024-06-05 RX ADMIN — METRONIDAZOLE 1000 MG: 500 INJECTION, SOLUTION INTRAVENOUS at 10:06

## 2024-06-05 NOTE — PROGRESS NOTES
Patient arrived for Flagyl infusion today ambulatory to bay. Therapy plan released and pharmacy notified. Patients int started to left AC.  1054 infusion started  1154 infusion finished  1154 Int flushed   1215 Patient discharged , given AVS and told to watch for signs and symptoms of adverse reactions , if had any to go to the ER. Patient given upcoming appointment      Patient also instructed if fetal movement changed or if she started experiencing abdominal or low back pain / vaginal cramping our bleeding to go to the ER or contact her OB.

## 2024-06-06 ENCOUNTER — INFUSION (OUTPATIENT)
Dept: INFUSION THERAPY | Facility: HOSPITAL | Age: 19
End: 2024-06-06
Attending: OBSTETRICS & GYNECOLOGY
Payer: MEDICAID

## 2024-06-06 DIAGNOSIS — A59.01 TRICHOMONAS VAGINITIS: Primary | ICD-10-CM

## 2024-06-06 LAB — CULTURE, GROUP B STREP: NORMAL

## 2024-06-06 PROCEDURE — 63600175 PHARM REV CODE 636 W HCPCS: Mod: UD | Performed by: OBSTETRICS & GYNECOLOGY

## 2024-06-06 PROCEDURE — 96365 THER/PROPH/DIAG IV INF INIT: CPT

## 2024-06-06 RX ORDER — METRONIDAZOLE 500 MG/100ML
1000 INJECTION, SOLUTION INTRAVENOUS DAILY
Status: DISCONTINUED | OUTPATIENT
Start: 2024-06-06 | End: 2024-06-06 | Stop reason: HOSPADM

## 2024-06-06 RX ORDER — SODIUM CHLORIDE 0.9 % (FLUSH) 0.9 %
10 SYRINGE (ML) INJECTION
OUTPATIENT
Start: 2024-06-06

## 2024-06-06 RX ORDER — HEPARIN 100 UNIT/ML
500 SYRINGE INTRAVENOUS
OUTPATIENT
Start: 2024-06-06

## 2024-06-06 RX ORDER — METRONIDAZOLE 500 MG/100ML
1000 INJECTION, SOLUTION INTRAVENOUS DAILY
Status: CANCELLED
Start: 2024-06-06

## 2024-06-06 RX ADMIN — METRONIDAZOLE 1000 MG: 500 INJECTION, SOLUTION INTRAVENOUS at 10:06

## 2024-06-06 NOTE — PROGRESS NOTES
Patient arrived for flagyl infusion today ambulatory to bay. Therapy plan released and pharmacy notified.   Patients int started to right hand   1049 infusion started  1157 infusion finished  1157 Int flushed   1200 Patient discharged , given AVS and told to watch for signs and symptoms of adverse reactions , if had any to go to the ER. Patient given upcoming appointment

## 2024-06-13 ENCOUNTER — INFUSION (OUTPATIENT)
Dept: INFUSION THERAPY | Facility: HOSPITAL | Age: 19
End: 2024-06-13
Attending: OBSTETRICS & GYNECOLOGY
Payer: MEDICAID

## 2024-06-13 ENCOUNTER — ROUTINE PRENATAL (OUTPATIENT)
Dept: OBSTETRICS AND GYNECOLOGY | Facility: CLINIC | Age: 19
End: 2024-06-13
Payer: MEDICAID

## 2024-06-13 VITALS
DIASTOLIC BLOOD PRESSURE: 70 MMHG | BODY MASS INDEX: 24.75 KG/M2 | OXYGEN SATURATION: 99 % | WEIGHT: 144.19 LBS | SYSTOLIC BLOOD PRESSURE: 111 MMHG | HEART RATE: 74 BPM | RESPIRATION RATE: 17 BRPM | SYSTOLIC BLOOD PRESSURE: 127 MMHG | HEART RATE: 83 BPM | DIASTOLIC BLOOD PRESSURE: 79 MMHG

## 2024-06-13 DIAGNOSIS — O99.019 IRON DEFICIENCY ANEMIA DURING PREGNANCY: Primary | ICD-10-CM

## 2024-06-13 DIAGNOSIS — Z3A.37 37 WEEKS GESTATION OF PREGNANCY: Primary | ICD-10-CM

## 2024-06-13 DIAGNOSIS — D50.9 IRON DEFICIENCY ANEMIA DURING PREGNANCY: Primary | ICD-10-CM

## 2024-06-13 DIAGNOSIS — Z98.891 HISTORY OF CESAREAN SECTION: ICD-10-CM

## 2024-06-13 DIAGNOSIS — D50.9 IRON DEFICIENCY ANEMIA DURING PREGNANCY: ICD-10-CM

## 2024-06-13 DIAGNOSIS — O99.019 IRON DEFICIENCY ANEMIA DURING PREGNANCY: ICD-10-CM

## 2024-06-13 DIAGNOSIS — Z34.83 ENCOUNTER FOR SUPERVISION OF NORMAL PREGNANCY IN MULTIGRAVIDA IN THIRD TRIMESTER: ICD-10-CM

## 2024-06-13 LAB
BILIRUB SERPL-MCNC: NORMAL MG/DL
BLOOD URINE, POC: NORMAL
COLOR, POC UA: NORMAL
GLUCOSE UR QL STRIP: NORMAL
KETONES UR QL STRIP: 15
LEUKOCYTE ESTERASE URINE, POC: NORMAL
NITRITE, POC UA: NORMAL
PH, POC UA: 7
PROTEIN, POC: NORMAL
SPECIFIC GRAVITY, POC UA: 1.01
UROBILINOGEN, POC UA: 1

## 2024-06-13 PROCEDURE — 99213 OFFICE O/P EST LOW 20 MIN: CPT | Mod: PBBFAC,25 | Performed by: OBSTETRICS & GYNECOLOGY

## 2024-06-13 PROCEDURE — 99999PBSHW POCT URINALYSIS W/O SCOPE: Mod: PBBFAC,,,

## 2024-06-13 PROCEDURE — 99999 PR PBB SHADOW E&M-EST. PATIENT-LVL III: CPT | Mod: PBBFAC,,, | Performed by: OBSTETRICS & GYNECOLOGY

## 2024-06-13 PROCEDURE — 81003 URINALYSIS AUTO W/O SCOPE: CPT | Mod: PBBFAC | Performed by: OBSTETRICS & GYNECOLOGY

## 2024-06-13 PROCEDURE — 96365 THER/PROPH/DIAG IV INF INIT: CPT

## 2024-06-13 PROCEDURE — 63600175 PHARM REV CODE 636 W HCPCS: Mod: UD | Performed by: OBSTETRICS & GYNECOLOGY

## 2024-06-13 PROCEDURE — 25000003 PHARM REV CODE 250: Mod: UD | Performed by: OBSTETRICS & GYNECOLOGY

## 2024-06-13 PROCEDURE — 59426 ANTEPARTUM CARE ONLY: CPT | Mod: S$PBB,TH,, | Performed by: OBSTETRICS & GYNECOLOGY

## 2024-06-13 RX ORDER — HEPARIN 100 UNIT/ML
5 SYRINGE INTRAVENOUS
OUTPATIENT
Start: 2024-06-20

## 2024-06-13 RX ORDER — SODIUM CHLORIDE 9 MG/ML
INJECTION, SOLUTION INTRAVENOUS CONTINUOUS
Status: DISCONTINUED | OUTPATIENT
Start: 2024-06-13 | End: 2024-06-13 | Stop reason: HOSPADM

## 2024-06-13 RX ORDER — EPINEPHRINE 0.3 MG/.3ML
0.3 INJECTION SUBCUTANEOUS ONCE AS NEEDED
OUTPATIENT
Start: 2024-06-20

## 2024-06-13 RX ORDER — HEPARIN 100 UNIT/ML
5 SYRINGE INTRAVENOUS
Status: DISCONTINUED | OUTPATIENT
Start: 2024-06-13 | End: 2024-06-13 | Stop reason: HOSPADM

## 2024-06-13 RX ORDER — DIPHENHYDRAMINE HYDROCHLORIDE 50 MG/ML
50 INJECTION INTRAMUSCULAR; INTRAVENOUS ONCE AS NEEDED
OUTPATIENT
Start: 2024-06-20

## 2024-06-13 RX ORDER — SODIUM CHLORIDE 0.9 % (FLUSH) 0.9 %
10 SYRINGE (ML) INJECTION
Status: DISCONTINUED | OUTPATIENT
Start: 2024-06-13 | End: 2024-06-13 | Stop reason: HOSPADM

## 2024-06-13 RX ORDER — ACETAMINOPHEN 325 MG/1
650 TABLET ORAL
OUTPATIENT
Start: 2024-06-13

## 2024-06-13 RX ORDER — HYDROCODONE BITARTRATE AND ACETAMINOPHEN 500; 5 MG/1; MG/1
TABLET ORAL ONCE
OUTPATIENT
Start: 2024-06-13 | End: 2024-06-13

## 2024-06-13 RX ORDER — SODIUM CHLORIDE 9 MG/ML
INJECTION, SOLUTION INTRAVENOUS CONTINUOUS
OUTPATIENT
Start: 2024-06-20

## 2024-06-13 RX ORDER — SODIUM CHLORIDE 0.9 % (FLUSH) 0.9 %
10 SYRINGE (ML) INJECTION
OUTPATIENT
Start: 2024-06-20

## 2024-06-13 RX ADMIN — FERRIC CARBOXYMALTOSE INJECTION 750 MG: 50 INJECTION, SOLUTION INTRAVENOUS at 11:06

## 2024-06-13 NOTE — PROGRESS NOTES
1135 Pt here for injectafer infusion, resting in recliner, TP released and pharmacy notified  1150 Injectafer infusion started to infuse over 30 min  1222 Infectafer infusion complete, tolerated well, watched pt for 15 min, pt has  appt notified pt of signs and symptoms to watch for, pt verbalized understanding  1235 pt discharged ambulatory with no adverse reaction noted, pt notified to go to ER with any adverse reactions, AVS given along with medication information  Follow up appt made 1 week

## 2024-06-13 NOTE — PROGRESS NOTES
Return OB Visit    19 y.o. female  at 37w3d   She c/o nothing today. She got her iron infusion today. CBC ordered today.  Reports good fetal movement or fluttering. Denies any vaginal bleeding, leakage of fluid, cramping, contractions, or pressure.   Total weight gain/weight loss in pregnancy: 5.987 kg (13 lb 3.2 oz)     Vitals  BP: 127/79  Pulse: 83  Weight: 65.4 kg (144 lb 3.2 oz)  Prenatal  Fundal Height (cm): 37 cm  Fetal Heart Rate: 135  Movement: Present  Edema  LLE Edema: None  RLE Edema: None  Facial: None  Additional Edema?: No    Prenatal Labs:  Lab Results   Component Value Date    GROUPTRH O Positive 2024    INDCOGEL Negative 2024    HGB 8.1 (L) 2024    HGB 8.0 (L) 2024    HCT 28.6 (L) 2024    HCT 27.2 (L) 2024     2024    SICKLE Negative 2023    HEPBSAG Non-Reactive 2023    [id number] Non-Reactive 2024    LABNGO Negative 2024    LABURIN Skin/Urogenital Carol Isolated, no further workup. 2024    [id number] Negative 2021       A: 37w3d           ICD-10-CM ICD-9-CM    1. 37 weeks gestation of pregnancy  Z3A.37 V22.2 POCT URINALYSIS W/O SCOPE      2. Iron deficiency anemia during pregnancy  O99.019 648.20 CBC Auto Differential    D50.9 280.9       3. Encounter for supervision of normal pregnancy in multigravida in third trimester  Z34.83 V22.1       4. History of  section  Z98.891 V45.89           No pregnancy checklist tasks were completed during this visit, and no tasks are pending completion.    -Benefits of breastfeeding   -Rooming In and Skin to Skin    P: Bleeding, daily fetal kick counts, and  labor/labor precautions discussed.    Questions answered to desired level of satisfaction  Verbalized understanding to all information and instructions provided.  Follow up in about 1 week (around 2024) for YESSICA visit.    Maximo Penaloza MD

## 2024-06-13 NOTE — PROGRESS NOTES
H/H worsened to 7.2/24.6 despite iron infusions. Therefore, 1 unit of PRBCs ordered due to scheduled C/S in 2 weeks.     MyChart sent.    Chapo Wall RN

## 2024-06-18 NOTE — PROGRESS NOTES
Return OB Visit    19 y.o. female  at 38w1d   She c/o feeling more uncomfortable.   C/S scheduled for next week.  Reports good fetal movement or fluttering. Denies any vaginal bleeding, leakage of fluid, cramping, contractions, or pressure.   Total weight gain/weight loss in pregnancy: 5.897 kg (13 lb)     Vitals  BP: 119/72  Pulse: 90  Weight: 65.3 kg (144 lb)  Prenatal  Fundal Height (cm): 37 cm  Fetal Heart Rate: 140  Movement: Present  Edema  LLE Edema: None  RLE Edema: None  Facial: None  Additional Edema?: No    Prenatal Labs:  Lab Results   Component Value Date    GROUPTRH O POS 2024    INDCOGEL Negative 2024    HGB 7.6 (L) 2024    HCT 25.7 (L) 2024     2024    SICKLE Negative 2023    HEPBSAG Non-Reactive 2024    [id number] Non-Reactive 2024    LABNGO Negative 2024    LABURIN Skin/Urogenital Carol Isolated, no further workup. 2024    [id number] Negative 2021       A: 38w1d           ICD-10-CM ICD-9-CM    1. 38 weeks gestation of pregnancy  Z3A.38 V22.2 POCT URINALYSIS W/O SCOPE      2. Hematuria, unspecified type  R31.9 599.70 Urine culture      3. Encounter for supervision of normal pregnancy in multigravida in third trimester  Z34.83 V22.1       4. History of  section  Z98.891 V45.89       5. Iron deficiency anemia during pregnancy  O99.019 648.20     D50.9 280.9           No pregnancy checklist tasks were completed during this visit, and no tasks are pending completion.    -Benefits of breastfeeding   -Rooming In and Skin to Skin    P: Bleeding, daily fetal kick counts, and  labor/labor precautions discussed.    Questions answered to desired level of satisfaction  Verbalized understanding to all information and instructions provided.  F/u for Repeat C/S as scheduled next week.    Maximo Penaloza MD

## 2024-06-20 ENCOUNTER — ROUTINE PRENATAL (OUTPATIENT)
Dept: OBSTETRICS AND GYNECOLOGY | Facility: CLINIC | Age: 19
End: 2024-06-20
Payer: MEDICAID

## 2024-06-20 VITALS
DIASTOLIC BLOOD PRESSURE: 72 MMHG | HEART RATE: 90 BPM | WEIGHT: 144 LBS | SYSTOLIC BLOOD PRESSURE: 119 MMHG | BODY MASS INDEX: 24.72 KG/M2

## 2024-06-20 DIAGNOSIS — D50.9 IRON DEFICIENCY ANEMIA DURING PREGNANCY: ICD-10-CM

## 2024-06-20 DIAGNOSIS — R31.9 HEMATURIA, UNSPECIFIED TYPE: ICD-10-CM

## 2024-06-20 DIAGNOSIS — Z3A.38 38 WEEKS GESTATION OF PREGNANCY: Primary | ICD-10-CM

## 2024-06-20 DIAGNOSIS — O99.019 IRON DEFICIENCY ANEMIA DURING PREGNANCY: ICD-10-CM

## 2024-06-20 DIAGNOSIS — Z34.83 ENCOUNTER FOR SUPERVISION OF NORMAL PREGNANCY IN MULTIGRAVIDA IN THIRD TRIMESTER: ICD-10-CM

## 2024-06-20 DIAGNOSIS — Z98.891 HISTORY OF CESAREAN SECTION: ICD-10-CM

## 2024-06-20 LAB
BILIRUB SERPL-MCNC: NORMAL MG/DL
BLOOD URINE, POC: NORMAL
COLOR, POC UA: YELLOW
GLUCOSE UR QL STRIP: NORMAL
KETONES UR QL STRIP: NORMAL
LEUKOCYTE ESTERASE URINE, POC: NORMAL
NITRITE, POC UA: NORMAL
PH, POC UA: 6.5
PROTEIN, POC: NORMAL
SPECIFIC GRAVITY, POC UA: 1.01
UROBILINOGEN, POC UA: 1

## 2024-06-20 PROCEDURE — 99999PBSHW POCT URINALYSIS W/O SCOPE: Mod: PBBFAC,,,

## 2024-06-20 PROCEDURE — 59426 ANTEPARTUM CARE ONLY: CPT | Mod: S$PBB,TH,, | Performed by: OBSTETRICS & GYNECOLOGY

## 2024-06-20 PROCEDURE — 99213 OFFICE O/P EST LOW 20 MIN: CPT | Mod: PBBFAC | Performed by: OBSTETRICS & GYNECOLOGY

## 2024-06-20 PROCEDURE — 81003 URINALYSIS AUTO W/O SCOPE: CPT | Mod: PBBFAC | Performed by: OBSTETRICS & GYNECOLOGY

## 2024-06-20 PROCEDURE — 87086 URINE CULTURE/COLONY COUNT: CPT | Mod: ,,, | Performed by: CLINICAL MEDICAL LABORATORY

## 2024-06-20 PROCEDURE — 99999 PR PBB SHADOW E&M-EST. PATIENT-LVL III: CPT | Mod: PBBFAC,,, | Performed by: OBSTETRICS & GYNECOLOGY

## 2024-06-22 LAB — UA COMPLETE W REFLEX CULTURE PNL UR: NORMAL

## 2024-06-23 ENCOUNTER — HOSPITAL ENCOUNTER (INPATIENT)
Facility: HOSPITAL | Age: 19
LOS: 4 days | Discharge: HOME | End: 2024-06-27
Admitting: OBSTETRICS & GYNECOLOGY
Payer: MEDICAID

## 2024-06-23 DIAGNOSIS — Z98.891 HISTORY OF CESAREAN SECTION: ICD-10-CM

## 2024-06-23 DIAGNOSIS — O47.9 UTERINE CONTRACTIONS: ICD-10-CM

## 2024-06-23 DIAGNOSIS — Z3A.38 38 WEEKS GESTATION OF PREGNANCY: ICD-10-CM

## 2024-06-23 DIAGNOSIS — O47.9 UTERINE CONTRACTIONS DURING PREGNANCY: Primary | ICD-10-CM

## 2024-06-23 DIAGNOSIS — O34.219 PREVIOUS CESAREAN DELIVERY AFFECTING PREGNANCY, ANTEPARTUM: ICD-10-CM

## 2024-06-23 LAB
ALBUMIN SERPL BCP-MCNC: 2.9 G/DL (ref 3.5–5)
ALBUMIN/GLOB SERPL: 0.8 {RATIO}
ALP SERPL-CCNC: 235 U/L (ref 52–144)
ALT SERPL W P-5'-P-CCNC: 14 U/L (ref 13–56)
ANION GAP SERPL CALCULATED.3IONS-SCNC: 15 MMOL/L (ref 7–16)
AST SERPL W P-5'-P-CCNC: 18 U/L (ref 15–37)
BASOPHILS # BLD AUTO: 0.03 K/UL (ref 0–0.2)
BASOPHILS NFR BLD AUTO: 0.4 % (ref 0–1)
BILIRUB SERPL-MCNC: 0.4 MG/DL (ref ?–1.2)
BUN SERPL-MCNC: 5 MG/DL (ref 7–18)
BUN/CREAT SERPL: 11 (ref 6–20)
CALCIUM SERPL-MCNC: 9.2 MG/DL (ref 8.5–10.1)
CHLORIDE SERPL-SCNC: 107 MMOL/L (ref 98–107)
CO2 SERPL-SCNC: 20 MMOL/L (ref 21–32)
CREAT SERPL-MCNC: 0.46 MG/DL (ref 0.55–1.02)
DIFFERENTIAL METHOD BLD: ABNORMAL
EGFR (NO RACE VARIABLE) (RUSH/TITUS): 141 ML/MIN/1.73M2
EOSINOPHIL # BLD AUTO: 0.04 K/UL (ref 0–0.5)
EOSINOPHIL NFR BLD AUTO: 0.6 % (ref 1–4)
ERYTHROCYTE [DISTWIDTH] IN BLOOD BY AUTOMATED COUNT: 21.2 % (ref 11.5–14.5)
GLOBULIN SER-MCNC: 3.8 G/DL (ref 2–4)
GLUCOSE SERPL-MCNC: 76 MG/DL (ref 74–106)
HBV SURFACE AG SERPL QL IA: NORMAL
HCT VFR BLD AUTO: 32 % (ref 38–47)
HGB BLD-MCNC: 9.4 G/DL (ref 12–16)
HIV 1+O+2 AB SERPL QL: NORMAL
IMM GRANULOCYTES # BLD AUTO: 0.08 K/UL (ref 0–0.04)
IMM GRANULOCYTES NFR BLD: 1.1 % (ref 0–0.4)
INDIRECT COOMBS: NORMAL
LYMPHOCYTES # BLD AUTO: 1.98 K/UL (ref 1–4.8)
LYMPHOCYTES NFR BLD AUTO: 28.1 % (ref 27–41)
MCH RBC QN AUTO: 25.2 PG (ref 27–31)
MCHC RBC AUTO-ENTMCNC: 29.4 G/DL (ref 32–36)
MCV RBC AUTO: 85.8 FL (ref 80–96)
MONOCYTES # BLD AUTO: 0.88 K/UL (ref 0–0.8)
MONOCYTES NFR BLD AUTO: 12.5 % (ref 2–6)
MPC BLD CALC-MCNC: 10.1 FL (ref 9.4–12.4)
NEUTROPHILS # BLD AUTO: 4.03 K/UL (ref 1.8–7.7)
NEUTROPHILS NFR BLD AUTO: 57.3 % (ref 53–65)
NRBC # BLD AUTO: 0 X10E3/UL
NRBC, AUTO (.00): 0 %
PLATELET # BLD AUTO: 257 K/UL (ref 150–400)
POTASSIUM SERPL-SCNC: 3.8 MMOL/L (ref 3.5–5.1)
PROT SERPL-MCNC: 6.7 G/DL (ref 6.4–8.2)
RBC # BLD AUTO: 3.73 M/UL (ref 4.2–5.4)
RH BLD: NORMAL
SODIUM SERPL-SCNC: 138 MMOL/L (ref 136–145)
SPECIMEN OUTDATE: NORMAL
SYPHILIS AB INTERPRETATION: NORMAL
WBC # BLD AUTO: 7.04 K/UL (ref 4.5–11)

## 2024-06-23 PROCEDURE — 86900 BLOOD TYPING SEROLOGIC ABO: CPT | Performed by: OBSTETRICS & GYNECOLOGY

## 2024-06-23 PROCEDURE — 99285 EMERGENCY DEPT VISIT HI MDM: CPT | Mod: 25

## 2024-06-23 PROCEDURE — 86850 RBC ANTIBODY SCREEN: CPT | Performed by: OBSTETRICS & GYNECOLOGY

## 2024-06-23 PROCEDURE — 86901 BLOOD TYPING SEROLOGIC RH(D): CPT | Performed by: OBSTETRICS & GYNECOLOGY

## 2024-06-23 PROCEDURE — 36415 COLL VENOUS BLD VENIPUNCTURE: CPT | Performed by: OBSTETRICS & GYNECOLOGY

## 2024-06-23 PROCEDURE — 63600175 PHARM REV CODE 636 W HCPCS: Performed by: OBSTETRICS & GYNECOLOGY

## 2024-06-23 PROCEDURE — 80053 COMPREHEN METABOLIC PANEL: CPT | Performed by: OBSTETRICS & GYNECOLOGY

## 2024-06-23 PROCEDURE — 86780 TREPONEMA PALLIDUM: CPT | Performed by: OBSTETRICS & GYNECOLOGY

## 2024-06-23 PROCEDURE — 87389 HIV-1 AG W/HIV-1&-2 AB AG IA: CPT | Performed by: OBSTETRICS & GYNECOLOGY

## 2024-06-23 PROCEDURE — 85025 COMPLETE CBC W/AUTO DIFF WBC: CPT | Performed by: OBSTETRICS & GYNECOLOGY

## 2024-06-23 PROCEDURE — 11000001 HC ACUTE MED/SURG PRIVATE ROOM

## 2024-06-23 PROCEDURE — 87340 HEPATITIS B SURFACE AG IA: CPT | Performed by: OBSTETRICS & GYNECOLOGY

## 2024-06-23 RX ORDER — SODIUM CHLORIDE, SODIUM LACTATE, POTASSIUM CHLORIDE, CALCIUM CHLORIDE 600; 310; 30; 20 MG/100ML; MG/100ML; MG/100ML; MG/100ML
INJECTION, SOLUTION INTRAVENOUS CONTINUOUS
Status: DISCONTINUED | OUTPATIENT
Start: 2024-06-23 | End: 2024-06-25

## 2024-06-23 RX ADMIN — SODIUM CHLORIDE, POTASSIUM CHLORIDE, SODIUM LACTATE AND CALCIUM CHLORIDE: 600; 310; 30; 20 INJECTION, SOLUTION INTRAVENOUS at 08:06

## 2024-06-24 ENCOUNTER — ANESTHESIA (OUTPATIENT)
Dept: OBSTETRICS AND GYNECOLOGY | Facility: HOSPITAL | Age: 19
End: 2024-06-24
Payer: MEDICAID

## 2024-06-24 ENCOUNTER — ANESTHESIA EVENT (OUTPATIENT)
Dept: OBSTETRICS AND GYNECOLOGY | Facility: HOSPITAL | Age: 19
End: 2024-06-24
Payer: MEDICAID

## 2024-06-24 PROBLEM — O47.9 UTERINE CONTRACTIONS DURING PREGNANCY: Status: ACTIVE | Noted: 2024-06-24

## 2024-06-24 PROBLEM — Z98.891 HISTORY OF CESAREAN SECTION: Status: ACTIVE | Noted: 2024-06-24

## 2024-06-24 PROBLEM — O47.9 UTERINE CONTRACTIONS: Status: ACTIVE | Noted: 2024-06-24

## 2024-06-24 PROBLEM — Z3A.39 39 WEEKS GESTATION OF PREGNANCY: Status: ACTIVE | Noted: 2024-06-24

## 2024-06-24 LAB
BILIRUB UR QL STRIP: NEGATIVE
CLARITY UR: CLEAR
COLOR UR: ABNORMAL
GLUCOSE UR STRIP-MCNC: NORMAL MG/DL
HCO3 UR-SCNC: 25.2 MMOL/L (ref 24–28)
HCT VFR BLD CALC: 37 % (ref 35–51)
KETONES UR STRIP-SCNC: 10 MG/DL
LDH SERPL L TO P-CCNC: 1.9 MMOL/L (ref 0.3–1.2)
LEUKOCYTE ESTERASE UR QL STRIP: NEGATIVE
NITRITE UR QL STRIP: NEGATIVE
PCO2 BLDA: 38 MMHG (ref 41–51)
PH SMN: 7.43 [PH] (ref 7.32–7.42)
PH UR STRIP: 7 PH UNITS
PO2 BLDA: 38 MMHG (ref 25–40)
POC BASE EXCESS: 0.9 MMOL/L (ref -2–3)
POC CO2: 26.4 MMOL/L
POC IONIZED CALCIUM: 1.37 MMOL/L (ref 1.15–1.35)
POC SATURATED O2: 74 % (ref 40–70)
POCT GLUCOSE: 62 MG/DL (ref 60–95)
POTASSIUM BLD-SCNC: 5.2 MMOL/L (ref 3.4–4.5)
PROT UR QL STRIP: NEGATIVE
RBC # UR STRIP: NEGATIVE /UL
SODIUM BLD-SCNC: 134 MMOL/L (ref 136–145)
SP GR UR STRIP: 1.01
UROBILINOGEN UR STRIP-ACNC: NORMAL MG/DL

## 2024-06-24 PROCEDURE — 59515 CESAREAN DELIVERY: CPT | Mod: TH,,, | Performed by: OBSTETRICS & GYNECOLOGY

## 2024-06-24 PROCEDURE — 25000003 PHARM REV CODE 250: Performed by: OBSTETRICS & GYNECOLOGY

## 2024-06-24 PROCEDURE — 25000003 PHARM REV CODE 250: Performed by: ANESTHESIOLOGY

## 2024-06-24 PROCEDURE — 87661 TRICHOMONAS VAGINALIS AMPLIF: CPT | Performed by: OBSTETRICS & GYNECOLOGY

## 2024-06-24 PROCEDURE — 63600175 PHARM REV CODE 636 W HCPCS: Mod: JZ,JG

## 2024-06-24 PROCEDURE — 27201423 OPTIME MED/SURG SUP & DEVICES STERILE SUPPLY: Performed by: OBSTETRICS & GYNECOLOGY

## 2024-06-24 PROCEDURE — 82330 ASSAY OF CALCIUM: CPT

## 2024-06-24 PROCEDURE — 36004724 HC OB OR TIME LEV III - 1ST 15 MIN: Performed by: OBSTETRICS & GYNECOLOGY

## 2024-06-24 PROCEDURE — 27201960 HC SPINAL TRAY: Performed by: ANESTHESIOLOGY

## 2024-06-24 PROCEDURE — 63600175 PHARM REV CODE 636 W HCPCS: Performed by: OBSTETRICS & GYNECOLOGY

## 2024-06-24 PROCEDURE — 27000284 HC CANNULA NASAL: Performed by: ANESTHESIOLOGY

## 2024-06-24 PROCEDURE — 83605 ASSAY OF LACTIC ACID: CPT

## 2024-06-24 PROCEDURE — 27000758 HC SPIROMETER

## 2024-06-24 PROCEDURE — 63600175 PHARM REV CODE 636 W HCPCS: Performed by: ANESTHESIOLOGY

## 2024-06-24 PROCEDURE — 84295 ASSAY OF SERUM SODIUM: CPT

## 2024-06-24 PROCEDURE — 81003 URINALYSIS AUTO W/O SCOPE: CPT | Performed by: OBSTETRICS & GYNECOLOGY

## 2024-06-24 PROCEDURE — 11000001 HC ACUTE MED/SURG PRIVATE ROOM

## 2024-06-24 PROCEDURE — 71000039 HC RECOVERY, EACH ADD'L HOUR: Performed by: OBSTETRICS & GYNECOLOGY

## 2024-06-24 PROCEDURE — 99900035 HC TECH TIME PER 15 MIN (STAT)

## 2024-06-24 PROCEDURE — 71000033 HC RECOVERY, INTIAL HOUR: Performed by: OBSTETRICS & GYNECOLOGY

## 2024-06-24 PROCEDURE — 63600175 PHARM REV CODE 636 W HCPCS: Mod: JZ,JG | Performed by: ANESTHESIOLOGY

## 2024-06-24 PROCEDURE — 36004725 HC OB OR TIME LEV III - EA ADD 15 MIN: Performed by: OBSTETRICS & GYNECOLOGY

## 2024-06-24 PROCEDURE — D9220A PRA ANESTHESIA: Mod: CRNA,,,

## 2024-06-24 PROCEDURE — 37000009 HC ANESTHESIA EA ADD 15 MINS: Performed by: OBSTETRICS & GYNECOLOGY

## 2024-06-24 PROCEDURE — 82947 ASSAY GLUCOSE BLOOD QUANT: CPT

## 2024-06-24 PROCEDURE — 84132 ASSAY OF SERUM POTASSIUM: CPT

## 2024-06-24 PROCEDURE — 51702 INSERT TEMP BLADDER CATH: CPT

## 2024-06-24 PROCEDURE — D9220A PRA ANESTHESIA: Mod: ANES,,, | Performed by: ANESTHESIOLOGY

## 2024-06-24 PROCEDURE — 27000716 HC OXISENSOR PROBE, ANY SIZE: Performed by: ANESTHESIOLOGY

## 2024-06-24 PROCEDURE — 37000008 HC ANESTHESIA 1ST 15 MINUTES: Performed by: OBSTETRICS & GYNECOLOGY

## 2024-06-24 PROCEDURE — 85014 HEMATOCRIT: CPT

## 2024-06-24 PROCEDURE — 82803 BLOOD GASES ANY COMBINATION: CPT

## 2024-06-24 RX ORDER — SODIUM CITRATE AND CITRIC ACID MONOHYDRATE 334; 500 MG/5ML; MG/5ML
30 SOLUTION ORAL
Status: DISCONTINUED | OUTPATIENT
Start: 2024-06-24 | End: 2024-06-24

## 2024-06-24 RX ORDER — MISOPROSTOL 200 UG/1
800 TABLET ORAL
Status: DISCONTINUED | OUTPATIENT
Start: 2024-06-24 | End: 2024-06-24

## 2024-06-24 RX ORDER — KETOROLAC TROMETHAMINE 30 MG/ML
INJECTION, SOLUTION INTRAMUSCULAR; INTRAVENOUS
Status: DISCONTINUED | OUTPATIENT
Start: 2024-06-24 | End: 2024-06-24

## 2024-06-24 RX ORDER — METOCLOPRAMIDE HYDROCHLORIDE 5 MG/ML
5 INJECTION INTRAMUSCULAR; INTRAVENOUS EVERY 6 HOURS PRN
Status: DISCONTINUED | OUTPATIENT
Start: 2024-06-24 | End: 2024-06-27 | Stop reason: HOSPADM

## 2024-06-24 RX ORDER — IBUPROFEN 800 MG/1
800 TABLET ORAL EVERY 8 HOURS
OUTPATIENT
Start: 2024-06-25

## 2024-06-24 RX ORDER — OXYTOCIN-SODIUM CHLORIDE 0.9% IV SOLN 30 UNIT/500ML 30-0.9/5 UT/ML-%
10 SOLUTION INTRAVENOUS ONCE AS NEEDED
Status: DISCONTINUED | OUTPATIENT
Start: 2024-06-24 | End: 2024-06-27 | Stop reason: HOSPADM

## 2024-06-24 RX ORDER — ACETAMINOPHEN 325 MG/1
650 TABLET ORAL EVERY 6 HOURS
Status: DISCONTINUED | OUTPATIENT
Start: 2024-06-24 | End: 2024-06-24

## 2024-06-24 RX ORDER — MISOPROSTOL 200 UG/1
800 TABLET ORAL ONCE AS NEEDED
Status: DISCONTINUED | OUTPATIENT
Start: 2024-06-24 | End: 2024-06-27 | Stop reason: HOSPADM

## 2024-06-24 RX ORDER — DEXAMETHASONE SODIUM PHOSPHATE 4 MG/ML
INJECTION, SOLUTION INTRA-ARTICULAR; INTRALESIONAL; INTRAMUSCULAR; INTRAVENOUS; SOFT TISSUE
Status: DISCONTINUED | OUTPATIENT
Start: 2024-06-24 | End: 2024-06-24

## 2024-06-24 RX ORDER — OXYCODONE HYDROCHLORIDE 5 MG/1
5 TABLET ORAL EVERY 4 HOURS PRN
Status: DISPENSED | OUTPATIENT
Start: 2024-06-24 | End: 2024-06-25

## 2024-06-24 RX ORDER — DIPHENHYDRAMINE HYDROCHLORIDE 50 MG/ML
INJECTION INTRAMUSCULAR; INTRAVENOUS
Status: DISCONTINUED | OUTPATIENT
Start: 2024-06-24 | End: 2024-06-24

## 2024-06-24 RX ORDER — METHYLERGONOVINE MALEATE 0.2 MG/ML
200 INJECTION INTRAVENOUS ONCE AS NEEDED
Status: COMPLETED | OUTPATIENT
Start: 2024-06-24 | End: 2024-06-24

## 2024-06-24 RX ORDER — ESMOLOL HYDROCHLORIDE 10 MG/ML
INJECTION INTRAVENOUS
Status: DISCONTINUED | OUTPATIENT
Start: 2024-06-24 | End: 2024-06-24

## 2024-06-24 RX ORDER — SODIUM CHLORIDE 0.9 % (FLUSH) 0.9 %
10 SYRINGE (ML) INJECTION
Status: DISCONTINUED | OUTPATIENT
Start: 2024-06-24 | End: 2024-06-27 | Stop reason: HOSPADM

## 2024-06-24 RX ORDER — FAMOTIDINE 10 MG/ML
20 INJECTION INTRAVENOUS
Status: DISCONTINUED | OUTPATIENT
Start: 2024-06-24 | End: 2024-06-24

## 2024-06-24 RX ORDER — KETOROLAC TROMETHAMINE 30 MG/ML
30 INJECTION, SOLUTION INTRAMUSCULAR; INTRAVENOUS EVERY 8 HOURS
OUTPATIENT
Start: 2024-06-24 | End: 2024-06-25

## 2024-06-24 RX ORDER — ONDANSETRON HYDROCHLORIDE 2 MG/ML
4 INJECTION, SOLUTION INTRAVENOUS EVERY 6 HOURS PRN
Status: ACTIVE | OUTPATIENT
Start: 2024-06-24 | End: 2024-06-25

## 2024-06-24 RX ORDER — DOCUSATE SODIUM 100 MG/1
200 CAPSULE, LIQUID FILLED ORAL 2 TIMES DAILY
Status: DISCONTINUED | OUTPATIENT
Start: 2024-06-24 | End: 2024-06-25

## 2024-06-24 RX ORDER — OXYTOCIN 10 [USP'U]/ML
10 INJECTION, SOLUTION INTRAMUSCULAR; INTRAVENOUS ONCE AS NEEDED
Status: DISCONTINUED | OUTPATIENT
Start: 2024-06-24 | End: 2024-06-27 | Stop reason: HOSPADM

## 2024-06-24 RX ORDER — OXYTOCIN-SODIUM CHLORIDE 0.9% IV SOLN 30 UNIT/500ML 30-0.9/5 UT/ML-%
95 SOLUTION INTRAVENOUS ONCE AS NEEDED
Status: DISCONTINUED | OUTPATIENT
Start: 2024-06-24 | End: 2024-06-27 | Stop reason: HOSPADM

## 2024-06-24 RX ORDER — MUPIROCIN 20 MG/G
OINTMENT TOPICAL
Status: DISCONTINUED | OUTPATIENT
Start: 2024-06-24 | End: 2024-06-24

## 2024-06-24 RX ORDER — ACETAMINOPHEN 325 MG/1
650 TABLET ORAL EVERY 6 HOURS PRN
Status: DISCONTINUED | OUTPATIENT
Start: 2024-06-24 | End: 2024-06-27 | Stop reason: HOSPADM

## 2024-06-24 RX ORDER — MORPHINE SULFATE 1 MG/ML
INJECTION, SOLUTION EPIDURAL; INTRATHECAL; INTRAVENOUS
Status: DISCONTINUED | OUTPATIENT
Start: 2024-06-24 | End: 2024-06-24

## 2024-06-24 RX ORDER — ONDANSETRON 4 MG/1
8 TABLET, ORALLY DISINTEGRATING ORAL EVERY 8 HOURS PRN
Status: DISCONTINUED | OUTPATIENT
Start: 2024-06-24 | End: 2024-06-27 | Stop reason: HOSPADM

## 2024-06-24 RX ORDER — METHYLERGONOVINE MALEATE 0.2 MG/ML
200 INJECTION INTRAVENOUS
Status: DISCONTINUED | OUTPATIENT
Start: 2024-06-24 | End: 2024-06-24

## 2024-06-24 RX ORDER — OXYTOCIN-SODIUM CHLORIDE 0.9% IV SOLN 30 UNIT/500ML 30-0.9/5 UT/ML-%
95 SOLUTION INTRAVENOUS ONCE
Status: COMPLETED | OUTPATIENT
Start: 2024-06-24 | End: 2024-06-24

## 2024-06-24 RX ORDER — CARBOPROST TROMETHAMINE 250 UG/ML
250 INJECTION, SOLUTION INTRAMUSCULAR
Status: DISCONTINUED | OUTPATIENT
Start: 2024-06-24 | End: 2024-06-24

## 2024-06-24 RX ORDER — OXYTOCIN/RINGER'S LACTATE 30/500 ML
334 PLASTIC BAG, INJECTION (ML) INTRAVENOUS ONCE
Status: DISCONTINUED | OUTPATIENT
Start: 2024-06-24 | End: 2024-06-24

## 2024-06-24 RX ORDER — BUPIVACAINE HYDROCHLORIDE 7.5 MG/ML
INJECTION, SOLUTION EPIDURAL; RETROBULBAR
Status: COMPLETED | OUTPATIENT
Start: 2024-06-24 | End: 2024-06-24

## 2024-06-24 RX ORDER — PHENYLEPHRINE HYDROCHLORIDE 10 MG/ML
INJECTION INTRAVENOUS
Status: DISCONTINUED | OUTPATIENT
Start: 2024-06-24 | End: 2024-06-24

## 2024-06-24 RX ORDER — OXYTOCIN/RINGER'S LACTATE 30/500 ML
95 PLASTIC BAG, INJECTION (ML) INTRAVENOUS ONCE
Status: DISCONTINUED | OUTPATIENT
Start: 2024-06-24 | End: 2024-06-24

## 2024-06-24 RX ORDER — AMOXICILLIN 250 MG
1 CAPSULE ORAL NIGHTLY PRN
OUTPATIENT
Start: 2024-06-24

## 2024-06-24 RX ORDER — TRANEXAMIC ACID 10 MG/ML
1000 INJECTION, SOLUTION INTRAVENOUS EVERY 30 MIN PRN
Status: DISCONTINUED | OUTPATIENT
Start: 2024-06-24 | End: 2024-06-27 | Stop reason: HOSPADM

## 2024-06-24 RX ORDER — OXYCODONE HYDROCHLORIDE 5 MG/1
10 TABLET ORAL EVERY 4 HOURS PRN
Status: ACTIVE | OUTPATIENT
Start: 2024-06-24 | End: 2024-06-25

## 2024-06-24 RX ORDER — KETOROLAC TROMETHAMINE 30 MG/ML
30 INJECTION, SOLUTION INTRAMUSCULAR; INTRAVENOUS EVERY 6 HOURS
Status: DISCONTINUED | OUTPATIENT
Start: 2024-06-24 | End: 2024-06-25

## 2024-06-24 RX ORDER — CARBOPROST TROMETHAMINE 250 UG/ML
250 INJECTION, SOLUTION INTRAMUSCULAR
Status: DISCONTINUED | OUTPATIENT
Start: 2024-06-24 | End: 2024-06-27 | Stop reason: HOSPADM

## 2024-06-24 RX ORDER — MISOPROSTOL 200 UG/1
1000 TABLET ORAL ONCE AS NEEDED
Status: DISCONTINUED | OUTPATIENT
Start: 2024-06-24 | End: 2024-06-27 | Stop reason: HOSPADM

## 2024-06-24 RX ORDER — DIPHENOXYLATE HYDROCHLORIDE AND ATROPINE SULFATE 2.5; .025 MG/1; MG/1
2 TABLET ORAL EVERY 6 HOURS PRN
Status: DISCONTINUED | OUTPATIENT
Start: 2024-06-24 | End: 2024-06-27 | Stop reason: HOSPADM

## 2024-06-24 RX ORDER — ONDANSETRON HYDROCHLORIDE 2 MG/ML
INJECTION, SOLUTION INTRAVENOUS
Status: DISCONTINUED | OUTPATIENT
Start: 2024-06-24 | End: 2024-06-24

## 2024-06-24 RX ORDER — SODIUM CHLORIDE, SODIUM LACTATE, POTASSIUM CHLORIDE, CALCIUM CHLORIDE 600; 310; 30; 20 MG/100ML; MG/100ML; MG/100ML; MG/100ML
INJECTION, SOLUTION INTRAVENOUS CONTINUOUS
Status: DISCONTINUED | OUTPATIENT
Start: 2024-06-24 | End: 2024-06-24

## 2024-06-24 RX ORDER — OXYTOCIN-SODIUM CHLORIDE 0.9% IV SOLN 30 UNIT/500ML 30-0.9/5 UT/ML-%
10 SOLUTION INTRAVENOUS ONCE
Status: DISCONTINUED | OUTPATIENT
Start: 2024-06-24 | End: 2024-06-24

## 2024-06-24 RX ADMIN — OXYCODONE 5 MG: 5 TABLET ORAL at 09:06

## 2024-06-24 RX ADMIN — BUPIVACAINE HYDROCHLORIDE 1.6 ML: 7.5 INJECTION, SOLUTION EPIDURAL; RETROBULBAR at 08:06

## 2024-06-24 RX ADMIN — Medication 334 MILLI-UNITS/MIN: at 08:06

## 2024-06-24 RX ADMIN — KETOROLAC TROMETHAMINE 30 MG: 30 INJECTION, SOLUTION INTRAMUSCULAR; INTRAVENOUS at 09:06

## 2024-06-24 RX ADMIN — SODIUM CHLORIDE, POTASSIUM CHLORIDE, SODIUM LACTATE AND CALCIUM CHLORIDE: 600; 310; 30; 20 INJECTION, SOLUTION INTRAVENOUS at 02:06

## 2024-06-24 RX ADMIN — ESMOLOL HYDROCHLORIDE 20 MG: 100 INJECTION, SOLUTION INTRAVENOUS at 08:06

## 2024-06-24 RX ADMIN — KETOROLAC TROMETHAMINE 30 MG: 30 INJECTION, SOLUTION INTRAMUSCULAR at 08:06

## 2024-06-24 RX ADMIN — CEFAZOLIN 2 G: 2 INJECTION, POWDER, FOR SOLUTION INTRAMUSCULAR; INTRAVENOUS at 07:06

## 2024-06-24 RX ADMIN — KETOROLAC TROMETHAMINE 30 MG: 30 INJECTION, SOLUTION INTRAMUSCULAR at 11:06

## 2024-06-24 RX ADMIN — PHENYLEPHRINE HYDROCHLORIDE 100 MCG: 10 INJECTION INTRAVENOUS at 08:06

## 2024-06-24 RX ADMIN — PHENYLEPHRINE HYDROCHLORIDE 200 MCG: 10 INJECTION INTRAVENOUS at 08:06

## 2024-06-24 RX ADMIN — MORPHINE SULFATE 0.2 MG: 1 INJECTION, SOLUTION EPIDURAL; INTRATHECAL; INTRAVENOUS at 08:06

## 2024-06-24 RX ADMIN — SODIUM CHLORIDE, POTASSIUM CHLORIDE, SODIUM LACTATE AND CALCIUM CHLORIDE: 600; 310; 30; 20 INJECTION, SOLUTION INTRAVENOUS at 09:06

## 2024-06-24 RX ADMIN — KETOROLAC TROMETHAMINE 30 MG: 30 INJECTION, SOLUTION INTRAMUSCULAR at 04:06

## 2024-06-24 RX ADMIN — SODIUM CHLORIDE, POTASSIUM CHLORIDE, SODIUM LACTATE AND CALCIUM CHLORIDE: 600; 310; 30; 20 INJECTION, SOLUTION INTRAVENOUS at 07:06

## 2024-06-24 RX ADMIN — DEXAMETHASONE SODIUM PHOSPHATE 8 MG: 4 INJECTION, SOLUTION INTRA-ARTICULAR; INTRALESIONAL; INTRAMUSCULAR; INTRAVENOUS; SOFT TISSUE at 08:06

## 2024-06-24 RX ADMIN — FAMOTIDINE 20 MG: 10 INJECTION, SOLUTION INTRAVENOUS at 07:06

## 2024-06-24 RX ADMIN — DIPHENHYDRAMINE HYDROCHLORIDE 12.5 MG: 50 INJECTION, SOLUTION INTRAMUSCULAR; INTRAVENOUS at 08:06

## 2024-06-24 RX ADMIN — MUPIROCIN: 20 OINTMENT TOPICAL at 07:06

## 2024-06-24 RX ADMIN — ONDANSETRON 8 MG: 2 INJECTION INTRAMUSCULAR; INTRAVENOUS at 08:06

## 2024-06-24 RX ADMIN — METHYLERGONOVINE MALEATE 200 MCG: 0.2 INJECTION, SOLUTION INTRAMUSCULAR; INTRAVENOUS at 11:06

## 2024-06-24 RX ADMIN — SODIUM CITRATE AND CITRIC ACID MONOHYDRATE 30 ML: 500; 334 SOLUTION ORAL at 07:06

## 2024-06-24 NOTE — ED PROVIDER NOTES
Encounter Date: 2024       History     Chief Complaint   Patient presents with    Abdominal Pain     This 20yo  patient of Dr. Penaloza at 38w6d, presents to [facility] with complaint of contractions since 5:50 pm.  She denies leakage of fluid or vaginal bleeding. Pt with hx of C/S at 35 weeks for failed induction for IUGR.  Pt states she did not dilate at all.   Cervix checked by me closed/ 70%/ -3/ vtx.    Pt denies need for pain medication.     The history is provided by the patient and medical records.     Review of patient's allergies indicates:  No Known Allergies  No past medical history on file.  Past Surgical History:   Procedure Laterality Date     SECTION  2021     Family History   Problem Relation Name Age of Onset    Leukemia Sister      Diabetes Sister       Social History     Tobacco Use    Smoking status: Never    Smokeless tobacco: Never   Substance Use Topics    Alcohol use: Never    Drug use: Not Currently     Review of Systems   All other systems reviewed and are negative.      Physical Exam     Initial Vitals   BP Pulse Resp Temp SpO2   -- -- -- -- --      MAP       --         Physical Exam    Vitals reviewed.  Constitutional: She appears well-developed and well-nourished. She is not diaphoretic. No distress.   HENT:   Head: Normocephalic and atraumatic.   Eyes: EOM are normal.   Neck: Neck supple.   Cardiovascular:  Normal rate.           Pulmonary/Chest: No respiratory distress.   Abdominal: Abdomen is soft. There is no abdominal tenderness.   Musculoskeletal:         General: Normal range of motion.      Cervical back: Neck supple.     Neurological: She is alert and oriented to person, place, and time.   Skin: Skin is warm and dry.   Psychiatric: She has a normal mood and affect.         ED Course   Procedures  Labs Reviewed          Imaging Results    None          Medications   lactated ringers infusion (has no administration in time range)     Medical Decision Making  Pt  admitted for IV hydration, admit labs.  Will proceed with Repeat C/S if contractions persist.      Amount and/or Complexity of Data Reviewed  Labs: ordered.    Risk  Prescription drug management.  Decision regarding hospitalization.                                      Clinical Impression:  Final diagnoses:  [O47.9] Uterine contractions during pregnancy (Primary)  [O34.219] Previous  delivery affecting pregnancy, antepartum  [Z3A.38] 38 weeks gestation of pregnancy          ED Disposition Condition    Admit Stable                Shama Gardiner,   24       Shama Gardiner,   24

## 2024-06-24 NOTE — H&P
[facility] -  Labor and Delivery  Obstetrics  History & Physical    Patient Name: Dilia Valerio  MRN: [id number]  Admission Date: 2024  Primary Care Provider: No, Primary Doctor    Subjective:     Principal Problem: contractions since 175    History of Present Illness:     Obstetric HPI:  Patient reports Date/time of onset:  tonight  contractions, active fetal movement, No vaginal bleeding , No loss of fluid     This pregnancy has been complicated by hx of prior C/S     OB History    Para Term  AB Living   2 1 0 1 0 1   SAB IAB Ectopic Multiple Live Births   0 0 0 0 1      # Outcome Date GA Lbr Evan/2nd Weight Sex Type Anes PTL Lv   2 Current            1   35w0d    CS-LTranv Spinal  ANNA      Obstetric Comments    C/S--35 weeks--IUGR, failed IOL     No past medical history on file.  Past Surgical History:   Procedure Laterality Date     SECTION  2021       PTA Medications   Medication Sig    ferrous sulfate (FEOSOL) 325 mg (65 mg iron) Tab tablet Take 1 tablet (325 mg total) by mouth 2 (two) times daily.    mvn-min75-iron-iron ps-om3-dha (CONCEPT DHA) 35-1-200 mg Cap 1 capsule.    ondansetron (ZOFRAN) 8 MG tablet Take 1 tablet (8 mg total) by mouth every 8 (eight) hours as needed for Nausea. (Patient not taking: Reported on 2024)    prenatal vit 10-iron fum-folic 65-1 mg Tab Take 1 tablet by mouth once daily.       Review of patient's allergies indicates:  No Known Allergies     Family History       Problem Relation (Age of Onset)    Diabetes Sister    Leukemia Sister          Tobacco Use    Smoking status: Never    Smokeless tobacco: Never   Substance and Sexual Activity    Alcohol use: Never    Drug use: Not Currently    Sexual activity: Yes     Birth control/protection: Injection, Condom     Review of Systems   Objective:     Vital Signs (Most Recent):    Vital Signs (24h Range):           There is no height or weight on file to calculate  "BMI.    FHT: normal range Cat 1 (reassuring)  TOCO:  Q 3-5 minutes    Physical Exam    Cervix:  Dilation:  0  Effacement:  70%  Station: -3  Presentation: Vertex     Significant Labs:  Lab Results   Component Value Date    GROUPTRH O Positive 2024    HEPBSAG Non-Reactive 2023       BMP: No results for input(s): "GLU", "NA", "K", "CL", "CO2", "BUN", "CREATININE", "CALCIUM", "MG" in the last 48 hours.  CBC: No results for input(s): "WBC", "RBC", "HGB", "HCT", "PLT", "MCV", "MCH", "MCHC" in the last 48 hours.  I have personallly reviewed all pertinent lab results from the last 24 hours.    Assessment/Plan:     19 y.o. female  at 38w6d admitted for:uterine contractions at 38w6d, previous C/S    There are no hospital problems to display for this patient.          Shama Gardiner, DO  Obstetrics  [facility] -  Labor and Delivery      "

## 2024-06-24 NOTE — PROGRESS NOTES
[facility] -  Labor and Delivery  Obstetrics  Labor Progress Note    Patient Name: Dilia Valerio  MRN: [id number]  Admission Date: 2024  Hospital Length of Stay: 1 days  Attending Physician: Maximo Penaloza MD  Primary Care Provider: Skye, Primary Doctor    Subjective:     Principal Problem:Uterine contractions    Interval History:  Dilia is a 19 y.o.  at 39w0d. She is c/o pain from contractions. Denies any LOF or VB. She did have some spotting yesterday prior to arriving to the hospital.    Objective:     Vital Signs (Most Recent):  Temp: 97.8 °F (36.6 °C) (24)  Pulse: (!) 59 (24)  Resp: 18 (24)  BP: 128/76 (24)  SpO2: 99 % (24) Vital Signs (24h Range):  Temp:  [97.8 °F (36.6 °C)] 97.8 °F (36.6 °C)  Pulse:  [59] 59  Resp:  [18] 18  SpO2:  [99 %] 99 %  BP: (128)/(76) 128/76     Weight: 64.4 kg (142 lb)  Body mass index is 24.37 kg/m².    FHT: 130s moderate variability +accels. No decels. Cat 1 (reassuring)  TOCO:  Q 3-5 minutes (palpable contractions, but not always picking up on the tocometer. Palpate strong)    Physical Exam:   Constitutional: She is oriented to person, place, and time. She appears well-developed and well-nourished.       Cardiovascular:  Normal rate.      Exam reveals no edema.        Pulmonary/Chest: Effort normal.        Abdominal: Soft. There is no abdominal tenderness.   Gravid. Palpable contractions.                 Neurological: She is alert and oriented to person, place, and time.    Skin: Skin is warm and dry.    Psychiatric: She has a normal mood and affect. Her behavior is normal.       Cervical Exam:  Dilation:  0-1  Effacement:  75%  Station: -2  Presentation: Vertex     Significant Labs:  Lab Results   Component Value Date    GROUPTRH O POS 2024    HEPBSAG Non-Reactive 2024       CBC:   Recent Labs   Lab 24  2101   WBC 7.04   RBC 3.73*   HGB 9.4*   HCT 32.0*      MCV 85.8   MCH  25.2*   MCHC 29.4*     CMP:   Recent Labs   Lab 24  2101   GLU 76   CALCIUM 9.2   ALBUMIN 2.9*   PROT 6.7      K 3.8   CO2 20*      BUN 5*   CREATININE 0.46*   ALKPHOS 235*   ALT 14   AST 18   BILITOT 0.4     I have personallly reviewed all pertinent lab results from the last 24 hours.    Assessment/Plan:     19 y.o. female  at 39w0d for:    Active Diagnoses:    Diagnosis Date Noted POA    PRINCIPAL PROBLEM:  Uterine contractions [O47.9] 2024 Yes    History of  section [Z98.891] 2024 Not Applicable    39 weeks gestation of pregnancy [Z3A.39] 2024 Not Applicable    Iron deficiency anemia during pregnancy [O99.019, D50.9] 2024 Yes      Problems Resolved During this Admission:       Discussed with the patient that due to her regular palpable contractions, gestational age of 39 weeks, and h/o C/S. The plan is to proceed with repeat C/S today instead of  as scheduled.   She agrees with the plan of care.   She has been NPO since midnight.   H/H is good s/p iron transfusion.       Maximo Penaloza MD  Obstetrics  [facility] -  Labor and Delivery

## 2024-06-24 NOTE — ANESTHESIA PROCEDURE NOTES
Spinal    Diagnosis: IUP  Patient location during procedure: OR  Start time: 6/24/2024 8:34 AM  Timeout: 6/24/2024 8:31 AM  End time: 6/24/2024 8:40 AM    Staffing  Authorizing Provider: Brody Cohen MD  Performing Provider: Brody Cohen MD    Staffing  Performed by: Brody Cohen MD  Authorized by: Brody Cohen MD    Preanesthetic Checklist  Completed: patient identified, risks and benefits discussed, pre-op evaluation and timeout performed  Spinal Block  Patient position: sitting  Prep: Betadine  Approach: midline  Location: L4-5  Injection technique: single shot  CSF Fluid: clear free-flowing CSF  Needle  Additional Documentation: negative aspiration for heme  Needle localization: anatomical landmarks  Assessment  Ease of block: easy  Additional Notes  Duramorph 0.2 mg added to SAB.  No complications.  Medications:    Medications: BUPivacaine (pf) (MARCAINE) injection 0.75% - Intraspinal   1.6 mL - 6/24/2024 8:40:00 AM

## 2024-06-24 NOTE — PLAN OF CARE
Problem:  Fall Injury Risk  Goal: Absence of Fall, Infant Drop and Related Injury  Outcome: Progressing     Problem: Adult Inpatient Plan of Care  Goal: Plan of Care Review  Outcome: Progressing  Goal: Patient-Specific Goal (Individualized)  Outcome: Progressing  Goal: Absence of Hospital-Acquired Illness or Injury  Outcome: Progressing  Goal: Optimal Comfort and Wellbeing  Outcome: Progressing  Goal: Readiness for Transition of Care  Outcome: Progressing     Problem: Infection  Goal: Absence of Infection Signs and Symptoms  Outcome: Progressing     Problem: Wound  Goal: Optimal Coping  Outcome: Progressing  Goal: Optimal Functional Ability  Outcome: Progressing  Goal: Absence of Infection Signs and Symptoms  Outcome: Progressing  Goal: Improved Oral Intake  Outcome: Progressing  Goal: Optimal Pain Control and Function  Outcome: Progressing  Goal: Skin Health and Integrity  Outcome: Progressing  Goal: Optimal Wound Healing  Outcome: Progressing     Problem: Postpartum ( Delivery)  Goal: Successful Parent Role Transition  Outcome: Progressing  Goal: Hemostasis  Outcome: Progressing  Goal: Effective Bowel Elimination  Outcome: Progressing  Goal: Fluid and Electrolyte Balance  Outcome: Progressing  Goal: Absence of Infection Signs and Symptoms  Outcome: Progressing  Goal: Anesthesia/Sedation Recovery  Outcome: Progressing  Goal: Optimal Pain Control and Function  Outcome: Progressing  Goal: Nausea and Vomiting Relief  Outcome: Progressing  Goal: Effective Urinary Elimination  Outcome: Progressing  Goal: Effective Oxygenation and Ventilation  Outcome: Progressing

## 2024-06-24 NOTE — ANESTHESIA POSTPROCEDURE EVALUATION
Anesthesia Post Evaluation    Patient: Dilia Valerio    Procedure(s) Performed: Procedure(s) (LRB):   SECTION (N/A)    Final Anesthesia Type: spinal      Patient location during evaluation: PACU  Post-procedure vital signs: reviewed and stable  Pain management: adequate  Airway patency: patent  TORIN mitigation strategies: Use of major conduction anesthesia (spinal/epidural) or peripheral nerve block  PONV status at discharge: No PONV  Anesthetic complications: no      Cardiovascular status: hemodynamically stable  Respiratory status: unassisted  Hydration status: euvolemic  Follow-up not needed.              Vitals Value Taken Time   /88 24 1215   Temp 36.5 °C (97.7 °F) 24 0938   Pulse 48 24 1215   Resp 16 24 0938   SpO2 100 % 24 1050   Vitals shown include unfiled device data.      Event Time   Out of Recovery 2024 11:36:00         Pain/Deborah Score: No data recorded

## 2024-06-24 NOTE — L&D DELIVERY NOTE
[facility] -  Labor and Delivery   Section   Operative Note    SUMMARY     Date of Procedure: 2024     Procedure: Procedure(s) (LRB):   SECTION (N/A) repeat    Surgeons and Role:     * Maximo Penaloza MD - Primary    Assisting Surgeon: None    Pre-Operative Diagnosis: History of  section [Z98.891], uterine contractions    Post-Operative Diagnosis: Post-Op Diagnosis Codes:     * History of  section [Z98.891], uterine contractions    Anesthesia: Spinal/Epidural    Technical Procedures Used: See below           Description of the Findings of the Procedure: 3 cm uterine window in the central aspect of the previous hysterotomy, viable female infant born at 0854 in the vertex position, weight and APGAR pending    Significant Surgical Tasks Conducted by the Assistant(s), if Applicable: n/a    Complications: No    Blood Loss: * No values recorded between 2024  8:44 AM and 2024  9:29 AM *     Patient was identified and consents were reviewed.   Patient was taken to OR with IVF running. Spinal anesthesia was placed without difficulty.  With patient in supine position in left lateral tilt, the legs are  and Sun Catheter placed and positioning to supine done.   Abdomen prepped with Chloroprep and 3 minute drying time allowed prior to draping of the abdomen.   Time out taken with OR team members.    A Pfannenstiel skin incision was made through the skin, transverse fascial incision developed, rectus muscles  in the midline and the peritoneum entered bluntly.   Minimal  adhesions noted of the omentum to the anterior abdominal wall that were taken down with the Bovie.    A bladder blade was placed inferiorly and a Polanco retractor was placed superiorly.  The lower uterine segment and position of the fetus identified.    A Low Transverse hysterotomy was made through a thin lower uterine segment that had a 3 cm uterine window and extended superiorly and  inferiorly with blunt dissection.   Clear fluid noted.  Infant delivered from vertex presentation atraumatically.  Cord clamped after one minute and  handed to attending nurse.  Cord blood taken, placenta expressed. Placenta donated.  The uterus was exteriorized and cleared of all clot and debris.  The hysterotomy was closed with 0-Monocryl in running locking fashion and a second imbricating layer. Excellent hemostasis noted.   The gutters were cleaned with moist laparotomy sponge.   The retractors were removed. The uterus placed back into the abdominal cavity.     Fascia was closed with 0 looped PDS in running fashion. Subcutaneous tissue was reapproximated with 2-0 plain gut.  Skin closure with 4 0 Monocryl in a subcuticular fashion.  Wound dressed with Dermabond.           Specimens: Cord blood and gas. (Placenta donated.)  Specimen (24h ago, onward)      None            Condition: Good    VTE Risk Mitigation (From admission, onward)           Ordered     IP VTE LOW RISK PATIENT  Once         24 07     Place sequential compression device  Until discontinued         24 07                    Disposition: PACU - hemodynamically stable.    Attestation: Good         Delivery Information for Lizbeth Valerio    Birth information:  YOB: 2025   Time of birth: 8:54 AM   Sex: female   Head Delivery Date/Time:     Delivery type:    Gestational Age: 39w0d        Delivery Providers    Delivering clinician:            Measurements    Weight:   Length:          Apgars    Living status:   Apgar Component Scores:  1 min.:  5 min.:  10 min.:  15 min.:  20 min.:    Skin color:         Heart rate:         Reflex irritability:         Muscle tone:         Respiratory effort:         Total:                                  Interventions/Resuscitation           Cord    No data filed       Placenta    Placenta delivery date/time:   Placenta removal:            Labor Events:       labor:        Labor Onset Date/Time:         Dilation Complete Date/Time:         Start Pushing Date/Time:       Rupture Date/Time:            Rupture type:          Fluid Amount:       Fluid Color:        steroids:       Antibiotics given for GBS:       Induction:       Indications for induction:        Augmentation:       Indications for augmentation:       Labor complications:       Additional complications:          Cervical ripening:                     Delivery:      Episiotomy:       Indication for Episiotomy:       Perineal Lacerations:   Repaired:      Periurethral Laceration:   Repaired:     Labial Laceration:   Repaired:     Sulcus Laceration:   Repaired:     Vaginal Laceration:   Repaired:     Cervical Laceration:   Repaired:     Repair suture:       Repair # of packets:       Last Value - EBL - Nursing (mL):       Sum - EBL - Nursing (mL): 0     Last Value - EBL - Anesthesia (mL):      Calculated QBL (mL):       Running total QBL (mL):       Vaginal Sweep Performed:       Surgicount Correct:         Other providers:            Details (if applicable):  Trial of Labor      Categorization:      Priority:     Indications for :     Incision Type:       Additional  information:  Forceps:    Vacuum:    Breech:    Observed anomalies    Other (Comments):

## 2024-06-24 NOTE — TRANSFER OF CARE
"Anesthesia Transfer of Care Note    Patient: Dilia Valerio    Procedure(s) Performed: Procedure(s) (LRB):   SECTION (N/A)    Patient location: Labor and Delivery    Anesthesia Type: spinal    Transport from OR: Transported from OR on room air with adequate spontaneous ventilation    Post pain: adequate analgesia    Post assessment: no apparent anesthetic complications    Post vital signs: stable    Level of consciousness: awake and alert    Nausea/Vomiting: no nausea/vomiting    Complications: none    Transfer of care protocol was followed      Last vitals: Visit Vitals  BP (!) 117/56 (BP Location: Right arm, Patient Position: Lying)   Pulse 60   Temp 36.5 °C (97.7 °F) (Tympanic)   Resp 16   Ht 5' 4" (1.626 m)   Wt 64.4 kg (142 lb)   LMP  (LMP Unknown)   SpO2 100%   Breastfeeding No   BMI 24.37 kg/m²     "

## 2024-06-24 NOTE — ANESTHESIA PREPROCEDURE EVALUATION
2024  Dilia Valerio is a 19 y.o., female.      Pre-op Assessment    I have reviewed the Patient Summary Reports.    I have reviewed the NPO Status.   I have reviewed the Medications.     Review of Systems         Anesthesia Plan  Type of Anesthesia, risks & benefits discussed:    Anesthesia Type: Spinal  Intra-op Monitoring Plan: Standard ASA Monitors  Informed Consent: Informed consent signed with the Patient and all parties understand the risks and agree with anesthesia plan.  All questions answered.   ASA Score: 2    Ready For Surgery From Anesthesia Perspective.     .  NKDA  No known anesthetic complications    Hct 32     at 39 weeks for repeat     Adequate ROM at neck    Plan is SAB

## 2024-06-25 LAB
ANISOCYTOSIS BLD QL SMEAR: ABNORMAL
BASOPHILS # BLD AUTO: 0.02 K/UL (ref 0–0.2)
BASOPHILS # BLD AUTO: 0.03 K/UL (ref 0–0.2)
BASOPHILS NFR BLD AUTO: 0.2 % (ref 0–1)
BASOPHILS NFR BLD AUTO: 0.3 % (ref 0–1)
CRENATED CELLS: ABNORMAL
DACRYOCYTES BLD QL SMEAR: ABNORMAL
DIFFERENTIAL METHOD BLD: ABNORMAL
DIFFERENTIAL METHOD BLD: ABNORMAL
EOSINOPHIL # BLD AUTO: 0.03 K/UL (ref 0–0.5)
EOSINOPHIL # BLD AUTO: 0.05 K/UL (ref 0–0.5)
EOSINOPHIL NFR BLD AUTO: 0.3 % (ref 1–4)
EOSINOPHIL NFR BLD AUTO: 0.5 % (ref 1–4)
ERYTHROCYTE [DISTWIDTH] IN BLOOD BY AUTOMATED COUNT: 21.5 % (ref 11.5–14.5)
ERYTHROCYTE [DISTWIDTH] IN BLOOD BY AUTOMATED COUNT: 22.3 % (ref 11.5–14.5)
HCT VFR BLD AUTO: 23.8 % (ref 38–47)
HCT VFR BLD AUTO: 25 % (ref 38–47)
HGB BLD-MCNC: 6.9 G/DL (ref 12–16)
HGB BLD-MCNC: 7.4 G/DL (ref 12–16)
HYPOCHROMIA BLD QL SMEAR: ABNORMAL
IMM GRANULOCYTES # BLD AUTO: 0.06 K/UL (ref 0–0.04)
IMM GRANULOCYTES # BLD AUTO: 0.09 K/UL (ref 0–0.04)
IMM GRANULOCYTES NFR BLD: 0.6 % (ref 0–0.4)
IMM GRANULOCYTES NFR BLD: 0.8 % (ref 0–0.4)
LYMPHOCYTES # BLD AUTO: 1.48 K/UL (ref 1–4.8)
LYMPHOCYTES # BLD AUTO: 2.05 K/UL (ref 1–4.8)
LYMPHOCYTES NFR BLD AUTO: 13.3 % (ref 27–41)
LYMPHOCYTES NFR BLD AUTO: 22 % (ref 27–41)
MCH RBC QN AUTO: 25.2 PG (ref 27–31)
MCH RBC QN AUTO: 25.8 PG (ref 27–31)
MCHC RBC AUTO-ENTMCNC: 29 G/DL (ref 32–36)
MCHC RBC AUTO-ENTMCNC: 29.6 G/DL (ref 32–36)
MCV RBC AUTO: 86.9 FL (ref 80–96)
MCV RBC AUTO: 87.1 FL (ref 80–96)
MONOCYTES # BLD AUTO: 0.91 K/UL (ref 0–0.8)
MONOCYTES # BLD AUTO: 1.19 K/UL (ref 0–0.8)
MONOCYTES NFR BLD AUTO: 10.7 % (ref 2–6)
MONOCYTES NFR BLD AUTO: 9.8 % (ref 2–6)
MPC BLD CALC-MCNC: 10.8 FL (ref 9.4–12.4)
MPC BLD CALC-MCNC: 11 FL (ref 9.4–12.4)
NEUTROPHILS # BLD AUTO: 6.21 K/UL (ref 1.8–7.7)
NEUTROPHILS # BLD AUTO: 8.3 K/UL (ref 1.8–7.7)
NEUTROPHILS NFR BLD AUTO: 66.8 % (ref 53–65)
NEUTROPHILS NFR BLD AUTO: 74.7 % (ref 53–65)
NRBC # BLD AUTO: 0 X10E3/UL
NRBC # BLD AUTO: 0.02 X10E3/UL
NRBC, AUTO (.00): 0 %
NRBC, AUTO (.00): 0.2 %
OVALOCYTES BLD QL SMEAR: ABNORMAL
PLATELET # BLD AUTO: 222 K/UL (ref 150–400)
PLATELET # BLD AUTO: 233 K/UL (ref 150–400)
PLATELET MORPHOLOGY: ABNORMAL
POLYCHROMASIA BLD QL SMEAR: ABNORMAL
RBC # BLD AUTO: 2.74 M/UL (ref 4.2–5.4)
RBC # BLD AUTO: 2.87 M/UL (ref 4.2–5.4)
TRICHOMONAS NAT: POSITIVE
WBC # BLD AUTO: 11.11 K/UL (ref 4.5–11)
WBC # BLD AUTO: 9.31 K/UL (ref 4.5–11)

## 2024-06-25 PROCEDURE — 11000001 HC ACUTE MED/SURG PRIVATE ROOM

## 2024-06-25 PROCEDURE — 25000003 PHARM REV CODE 250: Performed by: OBSTETRICS & GYNECOLOGY

## 2024-06-25 PROCEDURE — 85025 COMPLETE CBC W/AUTO DIFF WBC: CPT | Performed by: OBSTETRICS & GYNECOLOGY

## 2024-06-25 PROCEDURE — 63600175 PHARM REV CODE 636 W HCPCS: Performed by: OBSTETRICS & GYNECOLOGY

## 2024-06-25 PROCEDURE — 63600175 PHARM REV CODE 636 W HCPCS: Performed by: ANESTHESIOLOGY

## 2024-06-25 PROCEDURE — 36415 COLL VENOUS BLD VENIPUNCTURE: CPT | Performed by: OBSTETRICS & GYNECOLOGY

## 2024-06-25 RX ORDER — TRIPROLIDINE/PSEUDOEPHEDRINE 2.5MG-60MG
800 TABLET ORAL EVERY 8 HOURS PRN
Status: DISCONTINUED | OUTPATIENT
Start: 2024-06-25 | End: 2024-06-27 | Stop reason: HOSPADM

## 2024-06-25 RX ORDER — IBUPROFEN 800 MG/1
800 TABLET ORAL EVERY 8 HOURS PRN
Status: DISCONTINUED | OUTPATIENT
Start: 2024-06-25 | End: 2024-06-25

## 2024-06-25 RX ORDER — SIMETHICONE 80 MG
1 TABLET,CHEWABLE ORAL EVERY 6 HOURS PRN
Status: DISCONTINUED | OUTPATIENT
Start: 2024-06-25 | End: 2024-06-27 | Stop reason: HOSPADM

## 2024-06-25 RX ORDER — HYDROMORPHONE HYDROCHLORIDE 2 MG/ML
1 INJECTION, SOLUTION INTRAMUSCULAR; INTRAVENOUS; SUBCUTANEOUS EVERY 4 HOURS PRN
Status: DISCONTINUED | OUTPATIENT
Start: 2024-06-25 | End: 2024-06-27 | Stop reason: HOSPADM

## 2024-06-25 RX ORDER — METRONIDAZOLE 500 MG/1
500 TABLET ORAL EVERY 12 HOURS
Status: DISCONTINUED | OUTPATIENT
Start: 2024-06-25 | End: 2024-06-27 | Stop reason: HOSPADM

## 2024-06-25 RX ORDER — DOCUSATE SODIUM 100 MG/1
200 CAPSULE, LIQUID FILLED ORAL 2 TIMES DAILY
Status: DISCONTINUED | OUTPATIENT
Start: 2024-06-25 | End: 2024-06-27 | Stop reason: HOSPADM

## 2024-06-25 RX ORDER — LANOLIN ALCOHOL/MO/W.PET/CERES
1 CREAM (GRAM) TOPICAL DAILY
Status: DISCONTINUED | OUTPATIENT
Start: 2024-06-25 | End: 2024-06-27 | Stop reason: HOSPADM

## 2024-06-25 RX ORDER — POLYETHYLENE GLYCOL 3350 17 G/17G
17 POWDER, FOR SOLUTION ORAL DAILY
Status: DISCONTINUED | OUTPATIENT
Start: 2024-06-25 | End: 2024-06-27 | Stop reason: HOSPADM

## 2024-06-25 RX ORDER — MUPIROCIN 20 MG/G
OINTMENT TOPICAL 2 TIMES DAILY
Status: DISCONTINUED | OUTPATIENT
Start: 2024-06-25 | End: 2024-06-27 | Stop reason: HOSPADM

## 2024-06-25 RX ORDER — OXYCODONE AND ACETAMINOPHEN 5; 325 MG/1; MG/1
1 TABLET ORAL EVERY 4 HOURS PRN
Status: DISCONTINUED | OUTPATIENT
Start: 2024-06-25 | End: 2024-06-27 | Stop reason: HOSPADM

## 2024-06-25 RX ORDER — BISACODYL 10 MG/1
10 SUPPOSITORY RECTAL ONCE AS NEEDED
Status: DISCONTINUED | OUTPATIENT
Start: 2024-06-25 | End: 2024-06-27 | Stop reason: HOSPADM

## 2024-06-25 RX ORDER — OXYCODONE AND ACETAMINOPHEN 10; 325 MG/1; MG/1
1 TABLET ORAL EVERY 4 HOURS PRN
Status: DISCONTINUED | OUTPATIENT
Start: 2024-06-25 | End: 2024-06-27 | Stop reason: HOSPADM

## 2024-06-25 RX ORDER — OXYTOCIN-SODIUM CHLORIDE 0.9% IV SOLN 30 UNIT/500ML 30-0.9/5 UT/ML-%
95 SOLUTION INTRAVENOUS ONCE
Status: DISCONTINUED | OUTPATIENT
Start: 2024-06-25 | End: 2024-06-25

## 2024-06-25 RX ORDER — DIPHENHYDRAMINE HCL 25 MG
25 CAPSULE ORAL EVERY 4 HOURS PRN
Status: DISCONTINUED | OUTPATIENT
Start: 2024-06-25 | End: 2024-06-27 | Stop reason: HOSPADM

## 2024-06-25 RX ORDER — PRENATAL WITH FERROUS FUM AND FOLIC ACID 3080; 920; 120; 400; 22; 1.84; 3; 20; 10; 1; 12; 200; 27; 25; 2 [IU]/1; [IU]/1; MG/1; [IU]/1; MG/1; MG/1; MG/1; MG/1; MG/1; MG/1; UG/1; MG/1; MG/1; MG/1; MG/1
1 TABLET ORAL DAILY
Status: DISCONTINUED | OUTPATIENT
Start: 2024-06-25 | End: 2024-06-27 | Stop reason: HOSPADM

## 2024-06-25 RX ORDER — ADHESIVE BANDAGE
30 BANDAGE TOPICAL 2 TIMES DAILY PRN
Status: DISCONTINUED | OUTPATIENT
Start: 2024-06-26 | End: 2024-06-27 | Stop reason: HOSPADM

## 2024-06-25 RX ADMIN — SIMETHICONE 80 MG: 80 TABLET, CHEWABLE ORAL at 05:06

## 2024-06-25 RX ADMIN — SODIUM CHLORIDE, POTASSIUM CHLORIDE, SODIUM LACTATE AND CALCIUM CHLORIDE: 600; 310; 30; 20 INJECTION, SOLUTION INTRAVENOUS at 03:06

## 2024-06-25 RX ADMIN — FERROUS SULFATE TAB 325 MG (65 MG ELEMENTAL FE) 1 EACH: 325 (65 FE) TAB at 10:06

## 2024-06-25 RX ADMIN — IBUPROFEN 800 MG: 100 SUSPENSION ORAL at 10:06

## 2024-06-25 RX ADMIN — OXYCODONE AND ACETAMINOPHEN 1 TABLET: 10; 325 TABLET ORAL at 05:06

## 2024-06-25 RX ADMIN — METRONIDAZOLE 500 MG: 500 TABLET ORAL at 09:06

## 2024-06-25 RX ADMIN — KETOROLAC TROMETHAMINE 30 MG: 30 INJECTION, SOLUTION INTRAMUSCULAR at 05:06

## 2024-06-25 RX ADMIN — DOCUSATE SODIUM 200 MG: 100 CAPSULE, LIQUID FILLED ORAL at 09:06

## 2024-06-25 RX ADMIN — OXYCODONE HYDROCHLORIDE AND ACETAMINOPHEN 1 TABLET: 5; 325 TABLET ORAL at 10:06

## 2024-06-25 RX ADMIN — METRONIDAZOLE 500 MG: 500 TABLET ORAL at 10:06

## 2024-06-25 NOTE — PROGRESS NOTES
[facility] -  Labor and Delivery  Obstetrics  Postpartum Progress Note    Patient Name: Dilia Valerio  MRN: [id number]  Admission Date: 6/23/2024  Hospital Length of Stay: 2 days  Attending Physician: Maximo Penaloza MD  Primary Care Provider: Skye, Primary Doctor    Subjective:     Principal Problem:Uterine contractions during pregnancy    Hospital course: s/p RLTCS, POD#1, stable.    Interval History: She did well overnight. She denies any dizziness or lightheadedness.    She is doing well this morning. She is tolerating a regular diet without nausea or vomiting. She is voiding spontaneously. She is ambulating. She has passed flatus, and has a BM. Vaginal bleeding is mild. She denies fever or chills. Abdominal pain is moderate and controlled with oral medications. She Is going to try breastfeeding today. Desires to see lactation. Currently she is formula feeding.      Objective:     Vital Signs (Most Recent):  Temp: 98.1 °F (36.7 °C) (06/25/24 0709)  Pulse: (!) 58 (06/25/24 0709)  Resp: 18 (06/25/24 0709)  BP: 112/68 (06/25/24 0709)  SpO2: 99 % (06/24/24 1940) Vital Signs (24h Range):  Temp:  [97.6 °F (36.4 °C)-98.9 °F (37.2 °C)] 98.1 °F (36.7 °C)  Pulse:  [] 58  Resp:  [16-18] 18  SpO2:  [89 %-100 %] 99 %  BP: (107-150)/(6-88) 112/68     Weight: 64.4 kg (142 lb)  Body mass index is 24.37 kg/m².      Intake/Output Summary (Last 24 hours) at 6/25/2024 0936  Last data filed at 6/25/2024 0437  Gross per 24 hour   Intake --   Output 2137 ml   Net -2137 ml       Physical Exam:   Constitutional: She is oriented to person, place, and time. She appears well-developed and well-nourished.       Cardiovascular:  Normal rate.      Exam reveals no edema.        Pulmonary/Chest: Effort normal.        Abdominal: Soft. She exhibits abdominal incision (c/d/i. Dermabond in place.). There is no abdominal tenderness.   FF                 Neurological: She is alert and oriented to person, place, and time.    Skin:  Skin is warm and dry.    Psychiatric: She has a normal mood and affect. Her behavior is normal.       Significant Labs:  Lab Results   Component Value Date    GROUPTRH O POS 2024    HEPBSAG Non-Reactive 2024     Recent Labs   Lab 24  0635   HGB 6.9*   HCT 23.8*       CBC:   Recent Labs   Lab 24  0635   WBC 11.11*   RBC 2.74*   HGB 6.9*   HCT 23.8*      MCV 86.9   MCH 25.2*   MCHC 29.0*     CMP:   Recent Labs   Lab 24  2101   GLU 76   CALCIUM 9.2   ALBUMIN 2.9*   PROT 6.7      K 3.8   CO2 20*      BUN 5*   CREATININE 0.46*   ALKPHOS 235*   ALT 14   AST 18   BILITOT 0.4     I have personallly reviewed all pertinent lab results from the last 24 hours.  Recent Lab Results         24  0635   24  1101        Appearance, UA   Clear       Baso # 0.02         Basophil % 0.2         Bilirubin (UA)   Negative       Color, UA   Light-Yellow       Differential Method Auto         Eos # 0.03         Eos % 0.3         Glucose, UA   Normal       Hematocrit 23.8         Hemoglobin 6.9         Immature Grans (Abs) 0.09         Immature Granulocytes 0.8         Ketones, UA   10       Leukocyte Esterase, UA   Negative       Lymph # 1.48         Lymph % 13.3         MCH 25.2         MCHC 29.0         MCV 86.9         Mono # 1.19         Mono % 10.7         MPV 11.0         Neutrophils, Abs 8.30         Neutrophils Relative 74.7         NITRITE UA   Negative       nRBC 0.2         NUCLEATED RBC ABSOLUTE 0.02         Blood, UA   Negative       pH, UA   7.0       Platelet Count 222         Protein, UA   Negative       RBC 2.74         RDW 21.5         Spec Grav UA   1.010       Trichomonas vaginalis, DNA, urine   Positive       UROBILINOGEN UA   Normal       WBC 11.11                 Assessment/Plan:     19 y.o. female  at 39w0d for:    Active Diagnoses:    Diagnosis Date Noted POA    PRINCIPAL PROBLEM:  Uterine contractions during pregnancy [O47.9] 2024 Yes     History of  section [Z98.891] 2024 Not Applicable    39 weeks gestation of pregnancy [Z3A.39] 2024 Not Applicable    Uterine contractions [O47.9] 2024 Unknown    Iron deficiency anemia during pregnancy [O99.019, D50.9] 2024 Yes      Problems Resolved During this Admission:       Due to H/H decreasing, repeat H/H ordered at 1530. Since she is asymptomatic, if it is stable or increasing, then continue expectant management and oral iron. If it decreases, then plan to give transfusion. Plan d/w pt who agrees with plan.  Advance care and ambulate today.  Trichomonas is still positive. Will start oral Flagyl today as well.     Disposition: As patient meets milestones, will plan to discharge in 1-2 days.    Maximo Penaloza MD  Obstetrics  [facility] -  Labor and Delivery

## 2024-06-26 LAB
ANISOCYTOSIS BLD QL SMEAR: ABNORMAL
BASOPHILS # BLD AUTO: 0.02 K/UL (ref 0–0.2)
BASOPHILS NFR BLD AUTO: 0.2 % (ref 0–1)
CRENATED CELLS: ABNORMAL
DIFFERENTIAL METHOD BLD: ABNORMAL
EOSINOPHIL # BLD AUTO: 0.11 K/UL (ref 0–0.5)
EOSINOPHIL NFR BLD AUTO: 1.3 % (ref 1–4)
ERYTHROCYTE [DISTWIDTH] IN BLOOD BY AUTOMATED COUNT: 22.6 % (ref 11.5–14.5)
HCT VFR BLD AUTO: 24.3 % (ref 38–47)
HGB BLD-MCNC: 6.9 G/DL (ref 12–16)
HYPOCHROMIA BLD QL SMEAR: ABNORMAL
IMM GRANULOCYTES # BLD AUTO: 0.09 K/UL (ref 0–0.04)
IMM GRANULOCYTES NFR BLD: 1.1 % (ref 0–0.4)
LYMPHOCYTES # BLD AUTO: 2.59 K/UL (ref 1–4.8)
LYMPHOCYTES NFR BLD AUTO: 31.6 % (ref 27–41)
MCH RBC QN AUTO: 25.5 PG (ref 27–31)
MCHC RBC AUTO-ENTMCNC: 28.4 G/DL (ref 32–36)
MCV RBC AUTO: 89.7 FL (ref 80–96)
MONOCYTES # BLD AUTO: 0.82 K/UL (ref 0–0.8)
MONOCYTES NFR BLD AUTO: 10 % (ref 2–6)
MPC BLD CALC-MCNC: 11 FL (ref 9.4–12.4)
NEUTROPHILS # BLD AUTO: 4.56 K/UL (ref 1.8–7.7)
NEUTROPHILS NFR BLD AUTO: 55.8 % (ref 53–65)
NRBC # BLD AUTO: 0.02 X10E3/UL
NRBC, AUTO (.00): 0.2 %
OVALOCYTES BLD QL SMEAR: ABNORMAL
PLATELET # BLD AUTO: 198 K/UL (ref 150–400)
PLATELET MORPHOLOGY: ABNORMAL
POLYCHROMASIA BLD QL SMEAR: ABNORMAL
RBC # BLD AUTO: 2.71 M/UL (ref 4.2–5.4)
WBC # BLD AUTO: 8.19 K/UL (ref 4.5–11)

## 2024-06-26 PROCEDURE — 94761 N-INVAS EAR/PLS OXIMETRY MLT: CPT

## 2024-06-26 PROCEDURE — 11000001 HC ACUTE MED/SURG PRIVATE ROOM

## 2024-06-26 PROCEDURE — 36415 COLL VENOUS BLD VENIPUNCTURE: CPT | Performed by: OBSTETRICS & GYNECOLOGY

## 2024-06-26 PROCEDURE — 25000003 PHARM REV CODE 250: Performed by: OBSTETRICS & GYNECOLOGY

## 2024-06-26 PROCEDURE — 63600175 PHARM REV CODE 636 W HCPCS: Performed by: OBSTETRICS & GYNECOLOGY

## 2024-06-26 PROCEDURE — 99900035 HC TECH TIME PER 15 MIN (STAT)

## 2024-06-26 PROCEDURE — 85025 COMPLETE CBC W/AUTO DIFF WBC: CPT | Performed by: OBSTETRICS & GYNECOLOGY

## 2024-06-26 RX ADMIN — METRONIDAZOLE 500 MG: 500 TABLET ORAL at 09:06

## 2024-06-26 RX ADMIN — OXYCODONE HYDROCHLORIDE AND ACETAMINOPHEN 1 TABLET: 5; 325 TABLET ORAL at 09:06

## 2024-06-26 RX ADMIN — SODIUM CHLORIDE 250 MG: 9 INJECTION, SOLUTION INTRAVENOUS at 10:06

## 2024-06-26 RX ADMIN — IBUPROFEN 800 MG: 100 SUSPENSION ORAL at 04:06

## 2024-06-26 RX ADMIN — FERROUS SULFATE TAB 325 MG (65 MG ELEMENTAL FE) 1 EACH: 325 (65 FE) TAB at 09:06

## 2024-06-26 NOTE — PROGRESS NOTES
[facility] -  Labor and Delivery  Obstetrics  Postpartum Progress Note    Patient Name: Dilia Valerio  MRN: [id number]  Admission Date: 6/23/2024  Hospital Length of Stay: 3 days  Attending Physician: Maximo Penaloza MD  Primary Care Provider: Skye, Primary Doctor    Subjective:     Principal Problem:Uterine contractions during pregnancy    Hospital course: s/p RLTCS, POD#2, stable.     Interval History: She did well overnight. She denies any dizziness or lightheadedness.     She is doing well this morning. She is tolerating a regular diet without nausea or vomiting. She is voiding spontaneously. She is ambulating. She has passed flatus, and has a BM. Vaginal bleeding is mild. She denies fever or chills. Abdominal pain is moderate and controlled with oral medications. She Is going to try breastfeeding today. Desires to see lactation. Currently she is formula feeding. She desires to wait until tomorrow to be discharged      Objective:     Vital Signs (Most Recent):  Temp: 98.2 °F (36.8 °C) (06/26/24 0713)  Pulse: (!) 52 (06/26/24 0713)  Resp: 18 (06/26/24 0914)  BP: 126/75 (06/26/24 0713)  SpO2: 98 % (06/25/24 1915) Vital Signs (24h Range):  Temp:  [97.2 °F (36.2 °C)-98.2 °F (36.8 °C)] 98.2 °F (36.8 °C)  Pulse:  [52-71] 52  Resp:  [18] 18  SpO2:  [98 %] 98 %  BP: (115-142)/(61-79) 126/75     Weight: 64.4 kg (142 lb)  Body mass index is 24.37 kg/m².      Intake/Output Summary (Last 24 hours) at 6/26/2024 0936  Last data filed at 6/25/2024 1728  Gross per 24 hour   Intake --   Output 550 ml   Net -550 ml       Physical Exam:   Constitutional: She is oriented to person, place, and time. She appears well-developed and well-nourished.       Cardiovascular:  Normal rate.      Exam reveals no edema.        Pulmonary/Chest: Effort normal.        Abdominal: Soft. She exhibits abdominal incision (c/d/i.). There is no abdominal tenderness.   FF                 Neurological: She is alert and oriented to person, place,  and time.    Skin: Skin is warm and dry.    Psychiatric: She has a normal mood and affect. Her behavior is normal.       Significant Labs:  Lab Results   Component Value Date    GROUPTRH O POS 2024    HEPBSAG Non-Reactive 2024     Recent Labs   Lab 24   HGB 6.9*   HCT 24.3*       CBC:   Recent Labs   Lab 24   WBC 8.19   RBC 2.71*   HGB 6.9*   HCT 24.3*      MCV 89.7   MCH 25.5*   MCHC 28.4*     I have personallly reviewed all pertinent lab results from the last 24 hours.  Recent Lab Results         24  1457        Aniso 2+   2+       Baso # 0.02   0.03       Basophil % 0.2   0.3       Crenated RBC's Few   Few       Differential Method Scan Smear   Scan Smear       Eos # 0.11   0.05       Eos % 1.3   0.5       Hematocrit 24.3   25.0       Hemoglobin 6.9   7.4       Hypo Few   1+       Immature Grans (Abs) 0.09   0.06       Immature Granulocytes 1.1   0.6       Lymph # 2.59   2.05       Lymph % 31.6   22.0       MCH 25.5   25.8       MCHC 28.4   29.6       MCV 89.7   87.1       Mono # 0.82   0.91       Mono % 10.0   9.8       MPV 11.0   10.8       Neutrophils, Abs 4.56   6.21       Neutrophils Relative 55.8   66.8       nRBC 0.2   0.0       NUCLEATED RBC ABSOLUTE 0.02   0.00       Ovalocytes Few   Few       PLATELET MORPHOLOGY Few Large Platelets   Few Large Platelets       Platelet Count 198   233       Poly Few   Few       RBC 2.71   2.87       RDW 22.6   22.3       Teardrop Cells   Few       WBC 8.19   9.31               Assessment/Plan:     19 y.o. female  at 39w0d for:    Active Diagnoses:    Diagnosis Date Noted POA    PRINCIPAL PROBLEM:  Uterine contractions during pregnancy [O47.9] 2024 Yes    History of  section [Z98.891] 2024 Not Applicable    39 weeks gestation of pregnancy [Z3A.39] 2024 Not Applicable    Uterine contractions [O47.9] 2024 Unknown    Iron deficiency anemia during pregnancy [O99.019,  D50.9] 05/29/2024 Yes      Problems Resolved During this Admission:       Due to severe iron deficiency anemia exacerbated by acute blood, iron infusion ordered. Due to her VS being normal and she is asymptomatic, blood transfusion is not indicated at this time.  Routine postpartum care.  Pain management.   Continue Flagyl 500 mg po BID for Trichomonas.    Disposition: As patient meets milestones, will plan to discharge tomorrow.    Maximo Penaloza MD  Obstetrics  [facility] -  Labor and Delivery

## 2024-06-26 NOTE — PHYSICIAN QUERY
Please clarify the nature / etiology of the patient's hematological values:    Acute blood loss anemia expected post-operatively

## 2024-06-26 NOTE — NURSING
2056- notified Dr. Gardiner about pts H&H \  2122- Dr. Gardiner called back, Dr. Penaloza is aware of pts H&H. No new orders given.

## 2024-06-27 VITALS
BODY MASS INDEX: 24.24 KG/M2 | RESPIRATION RATE: 20 BRPM | WEIGHT: 142 LBS | HEIGHT: 64 IN | TEMPERATURE: 97 F | SYSTOLIC BLOOD PRESSURE: 134 MMHG | DIASTOLIC BLOOD PRESSURE: 82 MMHG | OXYGEN SATURATION: 99 % | HEART RATE: 63 BPM

## 2024-06-27 LAB
ANISOCYTOSIS BLD QL SMEAR: ABNORMAL
BASOPHILS # BLD AUTO: 0.04 K/UL (ref 0–0.2)
BASOPHILS NFR BLD AUTO: 0.4 % (ref 0–1)
CRENATED CELLS: ABNORMAL
DIFFERENTIAL METHOD BLD: ABNORMAL
EOSINOPHIL # BLD AUTO: 0.15 K/UL (ref 0–0.5)
EOSINOPHIL NFR BLD AUTO: 1.4 % (ref 1–4)
ERYTHROCYTE [DISTWIDTH] IN BLOOD BY AUTOMATED COUNT: 23.1 % (ref 11.5–14.5)
HCT VFR BLD AUTO: 25.7 % (ref 38–47)
HGB BLD-MCNC: 7.6 G/DL (ref 12–16)
HYPOCHROMIA BLD QL SMEAR: ABNORMAL
IMM GRANULOCYTES # BLD AUTO: 0.29 K/UL (ref 0–0.04)
IMM GRANULOCYTES NFR BLD: 2.8 % (ref 0–0.4)
LYMPHOCYTES # BLD AUTO: 1.97 K/UL (ref 1–4.8)
LYMPHOCYTES NFR BLD AUTO: 18.9 % (ref 27–41)
MCH RBC QN AUTO: 25.9 PG (ref 27–31)
MCHC RBC AUTO-ENTMCNC: 29.6 G/DL (ref 32–36)
MCV RBC AUTO: 87.4 FL (ref 80–96)
MONOCYTES # BLD AUTO: 0.91 K/UL (ref 0–0.8)
MONOCYTES NFR BLD AUTO: 8.7 % (ref 2–6)
MPC BLD CALC-MCNC: 10.9 FL (ref 9.4–12.4)
NEUTROPHILS # BLD AUTO: 7.09 K/UL (ref 1.8–7.7)
NEUTROPHILS NFR BLD AUTO: 67.8 % (ref 53–65)
NRBC # BLD AUTO: 0.03 X10E3/UL
NRBC, AUTO (.00): 0.3 %
OVALOCYTES BLD QL SMEAR: ABNORMAL
PLATELET # BLD AUTO: 214 K/UL (ref 150–400)
PLATELET MORPHOLOGY: ABNORMAL
POLYCHROMASIA BLD QL SMEAR: ABNORMAL
RBC # BLD AUTO: 2.94 M/UL (ref 4.2–5.4)
WBC # BLD AUTO: 10.45 K/UL (ref 4.5–11)

## 2024-06-27 PROCEDURE — 85025 COMPLETE CBC W/AUTO DIFF WBC: CPT | Performed by: OBSTETRICS & GYNECOLOGY

## 2024-06-27 PROCEDURE — 25000003 PHARM REV CODE 250: Performed by: OBSTETRICS & GYNECOLOGY

## 2024-06-27 PROCEDURE — 36415 COLL VENOUS BLD VENIPUNCTURE: CPT | Performed by: OBSTETRICS & GYNECOLOGY

## 2024-06-27 RX ORDER — TRIPROLIDINE/PSEUDOEPHEDRINE 2.5MG-60MG
800 TABLET ORAL EVERY 8 HOURS PRN
Qty: 473 ML | Refills: 1 | Status: SHIPPED | OUTPATIENT
Start: 2024-06-27

## 2024-06-27 RX ORDER — OXYCODONE AND ACETAMINOPHEN 5; 325 MG/1; MG/1
1 TABLET ORAL EVERY 4 HOURS PRN
Qty: 20 TABLET | Refills: 0 | Status: SHIPPED | OUTPATIENT
Start: 2024-06-27

## 2024-06-27 RX ORDER — METRONIDAZOLE 500 MG/1
500 TABLET ORAL EVERY 12 HOURS
Qty: 10 TABLET | Refills: 0 | Status: SHIPPED | OUTPATIENT
Start: 2024-06-27 | End: 2024-07-02

## 2024-06-27 RX ORDER — FERROUS SULFATE 325(65) MG
325 TABLET, DELAYED RELEASE (ENTERIC COATED) ORAL DAILY
Qty: 30 TABLET | Refills: 2 | Status: SHIPPED | OUTPATIENT
Start: 2024-06-27

## 2024-06-27 RX ADMIN — OXYCODONE HYDROCHLORIDE AND ACETAMINOPHEN 1 TABLET: 5; 325 TABLET ORAL at 05:06

## 2024-06-27 RX ADMIN — FERROUS SULFATE TAB 325 MG (65 MG ELEMENTAL FE) 1 EACH: 325 (65 FE) TAB at 08:06

## 2024-06-27 RX ADMIN — METRONIDAZOLE 500 MG: 500 TABLET ORAL at 08:06

## 2024-06-27 NOTE — DISCHARGE SUMMARY
[facility] -  Labor and Delivery  Discharge Note  Short Stay    Procedure(s) (LRB):   SECTION (N/A)      OUTCOME: Patient tolerated treatment/procedure well without complication and is now ready for discharge.    DISPOSITION: Home or Self Care    FINAL DIAGNOSIS:  Postpartum care and examination immediately after delivery    FOLLOWUP: In clinic    DISCHARGE INSTRUCTIONS:    Discharge Procedure Orders   Diet Adult Regular     Pelvic Rest   Order Comments: X 6 weeks     Notify your health care provider if you experience any of the following:  temperature >100.4     Notify your health care provider if you experience any of the following:  persistent nausea and vomiting or diarrhea     Notify your health care provider if you experience any of the following:  severe uncontrolled pain     Notify your health care provider if you experience any of the following:  difficulty breathing or increased cough     Notify your health care provider if you experience any of the following:  severe persistent headache     Notify your health care provider if you experience any of the following:  worsening rash     Notify your health care provider if you experience any of the following:  persistent dizziness, light-headedness, or visual disturbances     Notify your health care provider if you experience any of the following:  increased confusion or weakness     Notify your health care provider if you experience any of the following:   Order Comments: Vaginal bleeding > 1 pad per hour, foul smelling vaginal discharge, purulent incisional drainage, or any other acute changes.     Lifting restrictions   Order Comments: No heavy lifting > 10 lbs x 2 weeks.     No driving until:   Order Comments: Off Percocet and no pain when pressing the brake.     Activity as tolerated        TIME SPENT ON DISCHARGE: 20 minutes

## 2024-06-27 NOTE — DISCHARGE SUMMARY
[facility] -  Labor and Delivery  Obstetrics  Discharge Summary      Patient Name: Dilia Valerio  MRN: [id number]  Admission Date: 2024  Hospital Length of Stay: 4 days  Discharge Date and Time:  2024 8:40 AM  Attending Physician: Maximo Penaloza MD   Discharging Provider: Maximo Penaloza MD  Primary Care Provider: Skye, Primary Doctor    HPI: This 20yo  patient of Dr. Penaloza at 38w6d, presents to [facility] with complaint of contractions since 5:50 pm on 2024.  She denies leakage of fluid or vaginal bleeding. Pt with hx of C/S at 35 weeks for failed induction for IUGR.  Pt states she did not dilate at all.   Cervix checked by me closed/ 70%/ -3/ vtx.    Pt denies need for pain medication.   She continued to contract overnight despite IVF hydration. Therefore, the decision was made to proceed with repeat C/S on 2024.    Procedure(s) (LRB):   SECTION (N/A)     Hospital Course: She underwent a repeat C/S. Uterine window noted at the time of C/S. She delivered a viable female infant without complication. Her postpartum course was eventful for iron deficiency anemia exacerbated by acute blood loss. She was asymptomatic. She was given an iron transfusion. H/H aman on POD#3.   Also her trichomonas test resulted positive again. Therefore, another treatment course with Flagyl was ordered.   On POD#3 she was deemed stable for discharge home.    Consults (From admission, onward)          Status Ordering Provider     Inpatient consult to Anesthesiology  Once        Provider:  (Not yet assigned)    Acknowledged MAXIMO PENALOZA            Final Active Diagnoses:    Diagnosis Date Noted POA    PRINCIPAL PROBLEM:  Postpartum care and examination immediately after delivery [Z39.0] 2024 Not Applicable    Uterine contractions during pregnancy [O47.9] 2024 Yes    History of  section [Z98.891] 2024 Not Applicable    39 weeks gestation of pregnancy [Z3A.39] 2024 Not  "Applicable    Uterine contractions [O47.9] 2024 Yes    Iron deficiency anemia during pregnancy [O99.019, D50.9] 2024 Yes      Problems Resolved During this Admission:        Labs: CMP No results for input(s): "NA", "K", "CL", "CO2", "GLU", "BUN", "CREATININE", "CALCIUM", "PROT", "ALBUMIN", "BILITOT", "ALKPHOS", "AST", "ALT", "ANIONGAP", "ESTGFRAFRICA", "EGFRNONAA" in the last 48 hours. and CBC   Recent Labs   Lab 24  1457 24  0424 24  0537   WBC 9.31 8.19 10.45   HGB 7.4* 6.9* 7.6*   HCT 25.0* 24.3* 25.7*    198 214       Feeding Method: bottle    Immunizations       Date Immunization Status Dose Route/Site Given by    24 0823 MMR Deferred 0.5 mL Subcutaneous/ David Boyd RN    24 0821 Tdap Deferred 0.5 mL Intramuscular/ David Boyd RN            Delivery:    Episiotomy:     Lacerations:     Repair suture:     Repair # of packets:     Blood loss (ml):       Birth information:  YOB: 2025   Time of birth: 8:54 AM   Sex: female   Delivery type: , Low Transverse   Gestational Age: 39w0d     Measurements    Weight: 3451 g  Weight (lbs): 7 lb 9.7 oz  Length: 50.8 cm  Length (in): 20"  Head circumference: 34 cm  Chest circumference: 35 cm  Abdominal girth: 30.5 cm         Delivery Clinician: Delivery Providers    Delivering clinician: Maximo Penaloza MD          Additional  information:  Forceps:    Vacuum:    Breech:    Observed anomalies      Living?:     Apgars    Living status: Living  Apgar Component Scores:  1 min.:  5 min.:  10 min.:  15 min.:  20 min.:    Skin color:  0  1       Heart rate:  2  2       Reflex irritability:  2  2       Muscle tone:  2  2       Respiratory effort:  2  2       Total:  8  9       Apgars assigned by: ALEKSANDRA RAMIREZ RN         Placenta: Delivered:       appearance  Pending Diagnostic Studies:       Procedure Component Value Units Date/Time    EXTRA TUBES [[id number]] Collected: 24 0537    Order " Status: Sent Lab Status: In process Updated: 06/27/24 0616    Specimen: Blood, Venous     Narrative:      The following orders were created for panel order EXTRA TUBES.  Procedure                               Abnormality         Status                     ---------                               -----------         ------                     Light Green Top Hold[[id number]]                            In process                   Please view results for these tests on the individual orders.    EXTRA TUBES [[id number]] Collected: 06/23/24 2109    Order Status: Sent Lab Status: In process Updated: 06/23/24 2109    Specimen: Blood, Venous     Narrative:      The following orders were created for panel order EXTRA TUBES.  Procedure                               Abnormality         Status                     ---------                               -----------         ------                     Light Blue Top Hold[[id number]]                             In process                   Please view results for these tests on the individual orders.            Discharged Condition: good    Disposition: Home or Self Care    Follow Up:   Follow-up Information       Maximo Penaloza MD Follow up in 1 week(s).    Specialty: Obstetrics and Gynecology  Why: 1 week for a nurse visit with Francesca for BP check. 2 weeks for postpartum care with Dr. Penaloza.  Contact information:  [address] [facility] [address]  [phone]                           Patient Instructions:      Diet Adult Regular     Pelvic Rest   Order Comments: X 6 weeks     Notify your health care provider if you experience any of the following:  temperature >100.4     Notify your health care provider if you experience any of the following:  persistent nausea and vomiting or diarrhea     Notify your health care provider if you experience any of the following:  severe uncontrolled pain     Notify your health care provider if you experience any of the following:  difficulty  breathing or increased cough     Notify your health care provider if you experience any of the following:  severe persistent headache     Notify your health care provider if you experience any of the following:  worsening rash     Notify your health care provider if you experience any of the following:  persistent dizziness, light-headedness, or visual disturbances     Notify your health care provider if you experience any of the following:  increased confusion or weakness     Notify your health care provider if you experience any of the following:   Order Comments: Vaginal bleeding > 1 pad per hour, foul smelling vaginal discharge, purulent incisional drainage, or any other acute changes.     Lifting restrictions   Order Comments: No heavy lifting > 10 lbs x 2 weeks.     No driving until:   Order Comments: Off Percocet and no pain when pressing the brake.     Activity as tolerated     Medications:  Current Discharge Medication List        START taking these medications    Details   ferrous sulfate 325 (65 FE) MG EC tablet Take 1 tablet (325 mg total) by mouth once daily.  Qty: 30 tablet, Refills: 2      ibuprofen 20 mg/mL oral liquid Take 40 mLs (800 mg total) by mouth every 8 (eight) hours as needed for Pain.  Qty: 473 mL, Refills: 1      metroNIDAZOLE (FLAGYL) 500 MG tablet Take 1 tablet (500 mg total) by mouth every 12 (twelve) hours. for 5 days  Qty: 10 tablet, Refills: 0      oxyCODONE-acetaminophen (PERCOCET) 5-325 mg per tablet Take 1 tablet by mouth every 4 (four) hours as needed for Pain.  Qty: 20 tablet, Refills: 0    Comments: n/a            STOP taking these medications       ferrous sulfate (FEOSOL) 325 mg (65 mg iron) Tab tablet Comments:   Reason for Stopping:         mvn-min75-iron-iron ps-om3-dha (CONCEPT DHA) 35-1-200 mg Cap Comments:   Reason for Stopping:         ondansetron (ZOFRAN) 8 MG tablet Comments:   Reason for Stopping:         prenatal vit 10-iron fum-folic 65-1 mg Tab Comments:    Reason for Stopping:               Maximo Penaloza MD  Obstetrics  [facility] -  Labor and Delivery

## 2024-06-27 NOTE — DISCHARGE INSTRUCTIONS
Topic Nurse Date Time Comments   PP Education Booklet Given  6/27/24   1035    Skin to skin  6/27/24 1035    Rooming in  6/27/24 1035    Importance of Exclusive Breastfeeding for 6 mo  6/27/24 1035 Pt. Desires to bottle feed   Bleeding  6/27/24 1035    Incision Care  6/27/24 1035    Sex  6/27/24 1035    Pain Management  6/27/24 1035    Perineal Care  6/27/24 1035    Minimizing Engorgement  6/27/24 1035    Collection & Storage of BM  6/27/24 1035    S/S of BF Problems  6/27/24 1035 Pt. Desires to bottle feed   Hand Expression  6/27/24 1035    Maternal s/s breast problems  6/27/24 1035      Mgnt of Common BF Problems (engorgement, sore & cracked nipples)  6/27/24 1035    PPD/Anxiety  6/27/24 1035

## 2024-06-27 NOTE — PROGRESS NOTES
[facility] -  Labor and Delivery  Obstetrics  Postpartum Progress Note    Patient Name: Dilia Valerio  MRN: [id number]  Admission Date: 6/23/2024  Hospital Length of Stay: 4 days  Attending Physician: Maximo Penaloza MD  Primary Care Provider: Skye, Primary Doctor    Subjective:     Principal Problem:Uterine contractions during pregnancy    Hospital course: s/p RLTCS, POD#3, stable     Interval History: She did well overnight. Pain is better controlled. Denies any dizziness or lightheadedness. Denies any HA, RUQ pain, vision changes. She had an iron infusion yesterday.    She is doing well this morning. She is tolerating a regular diet without nausea or vomiting. She is voiding spontaneously. She is ambulating. She has passed flatus, and has a BM. Vaginal bleeding is mild. She denies fever or chills. Abdominal pain is moderate and controlled with oral medications. She Is formula feeding      Objective:     Vital Signs (Most Recent):  Temp: 97.3 °F (36.3 °C) (06/27/24 0805)  Pulse: 63 (06/27/24 0805)  Resp: 20 (06/27/24 0805)  BP: 134/82 (06/27/24 0805)  SpO2: 99 % (06/26/24 1931) Vital Signs (24h Range):  Temp:  [97.3 °F (36.3 °C)-97.6 °F (36.4 °C)] 97.3 °F (36.3 °C)  Pulse:  [55-71] 63  Resp:  [16-20] 20  SpO2:  [99 %-100 %] 99 %  BP: (114-151)/(60-89) 134/82     Weight: 64.4 kg (142 lb)  Body mass index is 24.37 kg/m².    No intake or output data in the 24 hours ending 06/27/24 0822    Physical Exam:   Constitutional: She is oriented to person, place, and time. She appears well-developed and well-nourished.       Cardiovascular:  Normal rate.      Exam reveals no edema.        Pulmonary/Chest: Effort normal.        Abdominal: Soft. She exhibits abdominal incision (c/d/i and Dermabond intact.). There is no abdominal tenderness.   FF                 Neurological: She is alert and oriented to person, place, and time.    Skin: Skin is warm and dry.    Psychiatric: She has a normal mood and affect. Her  behavior is normal.       Significant Labs:  Lab Results   Component Value Date    GROUPTRH O POS 2024    HEPBSAG Non-Reactive 2024     Recent Labs   Lab 24  0537   HGB 7.6*   HCT 25.7*       CBC:   Recent Labs   Lab 24  0537   WBC 10.45   RBC 2.94*   HGB 7.6*   HCT 25.7*      MCV 87.4   MCH 25.9*   MCHC 29.6*     I have personallly reviewed all pertinent lab results from the last 24 hours.    Assessment/Plan:     19 y.o. female  at 39w0d for:    Active Diagnoses:    Diagnosis Date Noted POA    PRINCIPAL PROBLEM:  Uterine contractions during pregnancy [O47.9] 2024 Yes    History of  section [Z98.891] 2024 Not Applicable    39 weeks gestation of pregnancy [Z3A.39] 2024 Not Applicable    Uterine contractions [O47.9] 2024 Unknown    Iron deficiency anemia during pregnancy [O99.019, D50.9] 2024 Yes      Problems Resolved During this Admission:       Routine postpartum care.  Stable to d/c home.  Continue Ferrous sulfate daily at home.   Continue course of Flagyl for +TV.   Due to some intermittent elevated Bps, will have the patient RTC in 1 week for nurse visit for BP check.  RTC in 2 weeks for postpartum visit.      Disposition: As patient meets milestones, will plan to discharge today.    Maximo Penaloza MD  Obstetrics  [facility] -  Labor and Delivery

## 2024-06-27 NOTE — PLAN OF CARE
Problem:  Fall Injury Risk  Goal: Absence of Fall, Infant Drop and Related Injury  Outcome: Progressing     Problem: Adult Inpatient Plan of Care  Goal: Plan of Care Review  Outcome: Progressing  Goal: Patient-Specific Goal (Individualized)  Outcome: Progressing  Goal: Absence of Hospital-Acquired Illness or Injury  Outcome: Progressing  Goal: Optimal Comfort and Wellbeing  Outcome: Progressing  Goal: Readiness for Transition of Care  Outcome: Progressing     Problem: Infection  Goal: Absence of Infection Signs and Symptoms  Outcome: Progressing     Problem: Postpartum ( Delivery)  Goal: Successful Parent Role Transition  Outcome: Progressing  Goal: Hemostasis  Outcome: Progressing  Goal: Effective Bowel Elimination  Outcome: Progressing  Goal: Fluid and Electrolyte Balance  Outcome: Progressing  Goal: Absence of Infection Signs and Symptoms  Outcome: Progressing  Goal: Anesthesia/Sedation Recovery  Outcome: Progressing  Goal: Optimal Pain Control and Function  Outcome: Progressing  Goal: Nausea and Vomiting Relief  Outcome: Progressing  Goal: Effective Urinary Elimination  Outcome: Progressing  Goal: Effective Oxygenation and Ventilation  Outcome: Progressing

## 2024-06-27 NOTE — PLAN OF CARE
Patient discharged  Problem:  Fall Injury Risk  Goal: Absence of Fall, Infant Drop and Related Injury  Outcome: Met     Problem: Adult Inpatient Plan of Care  Goal: Plan of Care Review  Outcome: Met  Goal: Patient-Specific Goal (Individualized)  Outcome: Met  Goal: Absence of Hospital-Acquired Illness or Injury  Outcome: Met  Goal: Optimal Comfort and Wellbeing  Outcome: Met  Goal: Readiness for Transition of Care  Outcome: Met     Problem: Infection  Goal: Absence of Infection Signs and Symptoms  Outcome: Met     Problem: Wound  Goal: Optimal Coping  Outcome: Met  Goal: Optimal Functional Ability  Outcome: Met  Goal: Absence of Infection Signs and Symptoms  Outcome: Met  Goal: Improved Oral Intake  Outcome: Met  Goal: Optimal Pain Control and Function  Outcome: Met  Goal: Skin Health and Integrity  Outcome: Met  Goal: Optimal Wound Healing  Outcome: Met     Problem: Postpartum ( Delivery)  Goal: Successful Parent Role Transition  Outcome: Met  Goal: Hemostasis  Outcome: Met  Goal: Effective Bowel Elimination  Outcome: Met  Goal: Fluid and Electrolyte Balance  Outcome: Met  Goal: Absence of Infection Signs and Symptoms  Outcome: Met  Goal: Anesthesia/Sedation Recovery  Outcome: Met  Goal: Optimal Pain Control and Function  Outcome: Met  Goal: Nausea and Vomiting Relief  Outcome: Met  Goal: Effective Urinary Elimination  Outcome: Met  Goal: Effective Oxygenation and Ventilation  Outcome: Met

## 2024-06-27 NOTE — NURSING
"Patient given "Your Guide to Postpartum and  Care" for education during this hospital stay. Booklet provided with information resources. Patient oriented on how to use QR codes and find topics in book, and oriented to flip book in patient room. Patient verbalized that they are able to read and understand material provided to them. Instructed to call nurses for questions. Mom educated on benefits of skin to skin for at least 1 hour after delivery, the benefits of rooming in with infant in helping mom to learn and respond to feeding cues, caring for their infant, and bonding, and the importance on exclusive breastfeeding for the first 6 months.   "

## 2024-07-03 ENCOUNTER — CLINICAL SUPPORT (OUTPATIENT)
Dept: OBSTETRICS AND GYNECOLOGY | Facility: CLINIC | Age: 19
End: 2024-07-03
Payer: MEDICAID

## 2024-07-03 VITALS — HEART RATE: 78 BPM | SYSTOLIC BLOOD PRESSURE: 136 MMHG | DIASTOLIC BLOOD PRESSURE: 90 MMHG

## 2024-07-03 DIAGNOSIS — R03.0 ELEVATED BLOOD PRESSURE READING: Primary | ICD-10-CM

## 2024-07-03 PROCEDURE — 99213 OFFICE O/P EST LOW 20 MIN: CPT | Mod: PBBFAC

## 2024-07-03 PROCEDURE — 99999 PR PBB SHADOW E&M-EST. PATIENT-LVL III: CPT | Mod: PBBFAC,,,

## 2024-07-15 ENCOUNTER — PATIENT MESSAGE (OUTPATIENT)
Dept: OBSTETRICS AND GYNECOLOGY | Facility: CLINIC | Age: 19
End: 2024-07-15
Payer: MEDICAID

## 2024-07-31 ENCOUNTER — OFFICE VISIT (OUTPATIENT)
Dept: OBSTETRICS AND GYNECOLOGY | Facility: CLINIC | Age: 19
End: 2024-07-31
Payer: MEDICAID

## 2024-07-31 VITALS
HEART RATE: 85 BPM | DIASTOLIC BLOOD PRESSURE: 89 MMHG | HEIGHT: 64 IN | SYSTOLIC BLOOD PRESSURE: 127 MMHG | WEIGHT: 122.5 LBS | BODY MASS INDEX: 20.92 KG/M2

## 2024-07-31 DIAGNOSIS — Z30.013 ENCOUNTER FOR INITIAL PRESCRIPTION OF INJECTABLE CONTRACEPTIVE: Primary | ICD-10-CM

## 2024-07-31 PROCEDURE — 99212 OFFICE O/P EST SF 10 MIN: CPT | Mod: 25,,, | Performed by: ADVANCED PRACTICE MIDWIFE

## 2024-07-31 PROCEDURE — 3074F SYST BP LT 130 MM HG: CPT | Mod: CPTII,,, | Performed by: ADVANCED PRACTICE MIDWIFE

## 2024-07-31 PROCEDURE — 1159F MED LIST DOCD IN RCRD: CPT | Mod: CPTII,,, | Performed by: ADVANCED PRACTICE MIDWIFE

## 2024-07-31 PROCEDURE — 3008F BODY MASS INDEX DOCD: CPT | Mod: CPTII,,, | Performed by: ADVANCED PRACTICE MIDWIFE

## 2024-07-31 PROCEDURE — 96372 THER/PROPH/DIAG INJ SC/IM: CPT | Mod: ,,, | Performed by: ADVANCED PRACTICE MIDWIFE

## 2024-07-31 PROCEDURE — 3079F DIAST BP 80-89 MM HG: CPT | Mod: CPTII,,, | Performed by: ADVANCED PRACTICE MIDWIFE

## 2024-07-31 RX ORDER — MEDROXYPROGESTERONE ACETATE 150 MG/ML
150 INJECTION, SUSPENSION INTRAMUSCULAR
Status: COMPLETED | OUTPATIENT
Start: 2024-07-31 | End: 2024-07-31

## 2024-07-31 RX ADMIN — MEDROXYPROGESTERONE ACETATE 150 MG: 150 INJECTION, SUSPENSION INTRAMUSCULAR at 09:07

## 2024-07-31 NOTE — PROGRESS NOTES
"Patient ID:  Dilia Valerio is a 19 y.o. female      Chief Complaint:   Chief Complaint   Patient presents with    Contraception     Wants to start back on Depo. Had baby 1 month ago. Not Sexually Active.        HPI: Ms Valerio had a repeat c/s on 2024 by Dr. Ambar Penaloza.  She wants to start Depo.  She is not breastfeeding.  Her bleeding has stopped.  She has not been sexually active since giving birth.  She has taken Depo in the past without any problem.    LMP: No LMP recorded (lmp unknown).   Sexually active:  not currently  Contraceptive: Desires Depo.      No past medical history on file.  Past Surgical History:   Procedure Laterality Date     SECTION  2021     SECTION N/A 2024    Procedure:  SECTION;  Surgeon: Maximo Penaloza MD;  Location: [facility] L&[facility];  Service: OB/GYN;  Laterality: N/A;       OB History          2    Para   2    Term   1       1    AB        Living   2         SAB        IAB        Ectopic        Multiple   0    Live Births   2           Obstetric Comments    C/S--35 weeks--IUGR, failed IOL                 /89   Pulse 85   Ht 5' 4" (1.626 m)   Wt 55.6 kg (122 lb 8 oz)   LMP  (LMP Unknown) Comment: was on depo  BMI 21.03 kg/m²   Wt Readings from Last 3 Encounters:   24 55.6 kg (122 lb 8 oz)   24 64.4 kg (142 lb)   24 65.3 kg (144 lb)          ROS:  Review of Systems   Constitutional: Negative.    HENT: Negative.     Respiratory: Negative.     Cardiovascular: Negative.    Gastrointestinal: Negative.    Genitourinary:  Negative for dysuria, pelvic pain and vaginal bleeding.   Musculoskeletal: Negative.    Neurological: Negative.    Psychiatric/Behavioral: Negative.     Breast: negative.         PHYSICAL EXAM:  Physical Exam  Constitutional:       Appearance: Normal appearance.   HENT:      Head: Normocephalic.   Eyes:      Extraocular Movements: Extraocular movements intact.   Cardiovascular:      " Rate and Rhythm: Normal rate.      Pulses: Normal pulses.   Pulmonary:      Effort: Pulmonary effort is normal.   Abdominal:      General: Abdomen is flat. A surgical scar is present.      Palpations: Abdomen is soft.      Tenderness: There is no abdominal tenderness.          Comments: Incision is healing well, not redness or drainage   Musculoskeletal:         General: Normal range of motion.      Cervical back: Normal range of motion.   Skin:     General: Skin is warm and dry.   Neurological:      Mental Status: She is alert and oriented to person, place, and time.   Psychiatric:         Mood and Affect: Mood normal.         Behavior: Behavior normal.         Thought Content: Thought content normal.         Judgment: Judgment normal.        Assessment:  Dilia was seen today for contraception.    Diagnoses and all orders for this visit:    Encounter for initial prescription of injectable contraceptive    Other orders  -     medroxyPROGESTERone (DEPO-PROVERA) injection 150 mg          ICD-10-CM ICD-9-CM    1. Encounter for initial prescription of injectable contraceptive  Z30.013 V25.02           Plan:      Follow up in about 3 months (around 10/31/2024).

## 2024-09-23 ENCOUNTER — OFFICE VISIT (OUTPATIENT)
Dept: FAMILY MEDICINE | Facility: CLINIC | Age: 19
End: 2024-09-23
Payer: MEDICAID

## 2024-09-23 VITALS
RESPIRATION RATE: 13 BRPM | WEIGHT: 119 LBS | HEIGHT: 64 IN | OXYGEN SATURATION: 98 % | BODY MASS INDEX: 20.32 KG/M2 | DIASTOLIC BLOOD PRESSURE: 69 MMHG | SYSTOLIC BLOOD PRESSURE: 115 MMHG | TEMPERATURE: 98 F | HEART RATE: 77 BPM

## 2024-09-23 DIAGNOSIS — Z02.89 ENCOUNTER FOR PHYSICAL EXAMINATION RELATED TO EMPLOYMENT: Primary | ICD-10-CM

## 2024-09-23 DIAGNOSIS — D50.9 IRON DEFICIENCY ANEMIA, UNSPECIFIED IRON DEFICIENCY ANEMIA TYPE: Chronic | ICD-10-CM

## 2024-09-23 PROBLEM — O26.899 PAIN OF ROUND LIGAMENT AFFECTING PREGNANCY, ANTEPARTUM: Status: RESOLVED | Noted: 2024-04-22 | Resolved: 2024-09-23

## 2024-09-23 PROBLEM — O47.9 UTERINE CONTRACTIONS: Status: RESOLVED | Noted: 2024-06-24 | Resolved: 2024-09-23

## 2024-09-23 PROBLEM — R10.2 PAIN OF ROUND LIGAMENT AFFECTING PREGNANCY, ANTEPARTUM: Status: RESOLVED | Noted: 2024-04-22 | Resolved: 2024-09-23

## 2024-09-23 PROBLEM — O26.893 ABDOMINAL PAIN DURING PREGNANCY IN THIRD TRIMESTER: Status: RESOLVED | Noted: 2024-04-22 | Resolved: 2024-09-23

## 2024-09-23 PROBLEM — Z3A.30 30 WEEKS GESTATION OF PREGNANCY: Status: RESOLVED | Noted: 2024-04-22 | Resolved: 2024-09-23

## 2024-09-23 PROBLEM — R10.9 ABDOMINAL PAIN DURING PREGNANCY IN THIRD TRIMESTER: Status: RESOLVED | Noted: 2024-04-22 | Resolved: 2024-09-23

## 2024-09-23 PROBLEM — O47.9 UTERINE CONTRACTIONS DURING PREGNANCY: Status: RESOLVED | Noted: 2024-06-24 | Resolved: 2024-09-23

## 2024-09-23 PROBLEM — Z3A.39 39 WEEKS GESTATION OF PREGNANCY: Status: RESOLVED | Noted: 2024-06-24 | Resolved: 2024-09-23

## 2024-09-23 NOTE — PROGRESS NOTES
Clinic note     Patient name: Dilia Valerio is a 19 y.o. female   Chief compliant   Chief Complaint   Patient presents with    Employment Physical     Pt here for a work physical       Subjective     History of present illness   In clinic for employment physical, she has with her paperwork from [employer] that needs to be completed, this includes TB skin testing which will be done at another facility   She denies any concerns at present     Past Medical History: Iron deficiency anemia              Social History     Tobacco Use    Smoking status: Never    Smokeless tobacco: Never   Substance Use Topics    Alcohol use: Never    Drug use: Not Currently       Review of patient's allergies indicates:  No Known Allergies    Past Medical History:   Diagnosis Date    Iron deficiency anemia 2024       Past Surgical History:   Procedure Laterality Date     SECTION  2021     SECTION N/A 2024    Procedure:  SECTION;  Surgeon: Maximo Penaloza MD;  Location: [facility] L&D;  Service: OB/GYN;  Laterality: N/A;        Family History   Problem Relation Name Age of Onset    Leukemia Sister      Diabetes Sister         No current outpatient medications on file.    Review of Systems   Constitutional:  Negative for appetite change, chills, fatigue, fever and unexpected weight change.   Respiratory:  Negative for cough and shortness of breath.    Cardiovascular:  Negative for chest pain, palpitations and leg swelling.   Gastrointestinal:  Negative for abdominal pain, change in bowel habit, constipation, diarrhea, nausea and vomiting.   Genitourinary:  Negative for dysuria and frequency.   Musculoskeletal:  Negative for arthralgias, gait problem and myalgias.   Neurological:  Negative for dizziness, syncope, light-headedness and headaches.   Psychiatric/Behavioral:  Negative for dysphoric mood and sleep disturbance. The patient is not nervous/anxious.        Objective  "    /69   Pulse 77   Temp 98.2 °F (36.8 °C)   Resp 13   Ht 5' 4" (1.626 m)   Wt 54 kg (119 lb)   LMP 09/23/2024 (Exact Date)   SpO2 98%   Breastfeeding No   BMI 20.43 kg/m²     Physical Exam   Constitutional: She is oriented to person, place, and time. No distress.   HENT:   Head: Atraumatic.   Right Ear: Tympanic membrane and ear canal normal.   Left Ear: Tympanic membrane and ear canal normal.   Mouth/Throat: Mucous membranes are moist.   Eyes: Pupils are equal, round, and reactive to light. Conjunctivae are normal.   Cardiovascular: Normal rate and regular rhythm. Pulmonary:      Effort: Pulmonary effort is normal. No respiratory distress.      Breath sounds: Normal breath sounds. No wheezing, rhonchi or rales.     Abdominal: Soft. Bowel sounds are normal. She exhibits no distension. There is no abdominal tenderness.   Musculoskeletal:         General: Normal range of motion.      Cervical back: Neck supple.      Right lower leg: No edema.      Left lower leg: No edema.   Neurological: She is alert and oriented to person, place, and time. Gait normal.   Skin: Skin is warm and dry. No rash noted.   Psychiatric: Her behavior is normal. Mood normal.       Lab Results   Component Value Date    WBC 10.45 06/27/2024    HGB 7.6 (L) 06/27/2024    HCT 25.7 (L) 06/27/2024    MCV 87.4 06/27/2024     06/27/2024       CMP  Sodium   Date Value Ref Range Status   06/23/2024 138 136 - 145 mmol/L Final     Potassium   Date Value Ref Range Status   06/23/2024 3.8 3.5 - 5.1 mmol/L Final     Chloride   Date Value Ref Range Status   06/23/2024 107 98 - 107 mmol/L Final     CO2   Date Value Ref Range Status   06/23/2024 20 (L) 21 - 32 mmol/L Final     Glucose   Date Value Ref Range Status   06/23/2024 76 74 - 106 mg/dL Final     BUN   Date Value Ref Range Status   06/23/2024 5 (L) 7 - 18 mg/dL Final     Creatinine   Date Value Ref Range Status   06/23/2024 0.46 (L) 0.55 - 1.02 mg/dL Final     Calcium   Date Value " "Ref Range Status   06/23/2024 9.2 8.5 - 10.1 mg/dL Final     Total Protein   Date Value Ref Range Status   06/23/2024 6.7 6.4 - 8.2 g/dL Final     Albumin   Date Value Ref Range Status   06/23/2024 2.9 (L) 3.5 - 5.0 g/dL Final     Bilirubin, Total   Date Value Ref Range Status   06/23/2024 0.4 >0.0 - 1.2 mg/dL Final     Alk Phos   Date Value Ref Range Status   06/23/2024 235 (H) 52 - 144 U/L Final     AST   Date Value Ref Range Status   06/23/2024 18 15 - 37 U/L Final     ALT   Date Value Ref Range Status   06/23/2024 14 13 - 56 U/L Final     Anion Gap   Date Value Ref Range Status   06/23/2024 15 7 - 16 mmol/L Final     eGFR    Date Value Ref Range Status   09/20/2020 182       Comment:     The above 11 analytes were performed by [id number] [place], [place] [id number]     eGFR   Date Value Ref Range Status   09/20/2020 150       No results found for: "TSH"  No results found for: "CHOL"  No results found for: "HDL"  No results found for: "LDLCALC"  No results found for: "TRIG"  No results found for: "CHOLHDL"  No results found for: "LABA1C", "HGBA1C"      Assessment and Plan   Encounter for physical examination related to employment    Iron deficiency anemia, unspecified iron deficiency anemia type          Patient Instructions  Patient Instructions   Follow up in clinic as needed                                 "

## 2025-06-23 ENCOUNTER — OFFICE VISIT (OUTPATIENT)
Dept: OBSTETRICS AND GYNECOLOGY | Facility: CLINIC | Age: 20
End: 2025-06-23
Payer: MEDICAID

## 2025-06-23 VITALS
HEART RATE: 85 BPM | WEIGHT: 124 LBS | SYSTOLIC BLOOD PRESSURE: 124 MMHG | DIASTOLIC BLOOD PRESSURE: 80 MMHG | BODY MASS INDEX: 21.28 KG/M2

## 2025-06-23 DIAGNOSIS — Z30.013 ENCOUNTER FOR INITIAL PRESCRIPTION OF INJECTABLE CONTRACEPTIVE: ICD-10-CM

## 2025-06-23 DIAGNOSIS — Z32.02 URINE PREGNANCY TEST NEGATIVE: Primary | ICD-10-CM

## 2025-06-23 PROCEDURE — 3079F DIAST BP 80-89 MM HG: CPT | Mod: CPTII,,, | Performed by: ADVANCED PRACTICE MIDWIFE

## 2025-06-23 PROCEDURE — 3074F SYST BP LT 130 MM HG: CPT | Mod: CPTII,,, | Performed by: ADVANCED PRACTICE MIDWIFE

## 2025-06-23 PROCEDURE — 96372 THER/PROPH/DIAG INJ SC/IM: CPT | Mod: ,,, | Performed by: ADVANCED PRACTICE MIDWIFE

## 2025-06-23 PROCEDURE — 3008F BODY MASS INDEX DOCD: CPT | Mod: CPTII,,, | Performed by: ADVANCED PRACTICE MIDWIFE

## 2025-06-23 PROCEDURE — 99212 OFFICE O/P EST SF 10 MIN: CPT | Mod: 25,,, | Performed by: ADVANCED PRACTICE MIDWIFE

## 2025-06-23 PROCEDURE — 1159F MED LIST DOCD IN RCRD: CPT | Mod: CPTII,,, | Performed by: ADVANCED PRACTICE MIDWIFE

## 2025-06-23 RX ORDER — MEDROXYPROGESTERONE ACETATE 150 MG/ML
150 INJECTION, SUSPENSION INTRAMUSCULAR
Status: COMPLETED | OUTPATIENT
Start: 2025-06-23 | End: 2025-06-23

## 2025-06-23 RX ADMIN — MEDROXYPROGESTERONE ACETATE 150 MG: 150 INJECTION, SUSPENSION INTRAMUSCULAR at 11:06

## 2025-06-23 NOTE — PROGRESS NOTES
Patient ID:  Dilia Valerio is a 20 y.o. female      Chief Complaint:   Chief Complaint   Patient presents with    Contraception     Pap. Start Depo. Sexually Active        HPI:  Ms Valerio would like to get Depo.  She had one last July after the birth of her second child. She has been getting them in [place] since then.  She last had a Depo 2025.  She is also due a pap and gyn exam but declines it today.  She denies any ACHES  Sexually active:  yes  Contraceptive: Depo      Past Medical History:   Diagnosis Date    Iron deficiency anemia 2024     Past Surgical History:   Procedure Laterality Date     SECTION  2021     SECTION N/A 2024    Procedure:  SECTION;  Surgeon: Maximo Penaloza MD;  Location: [facility] L&D;  Service: OB/GYN;  Laterality: N/A;       OB History          2    Para   2    Term   1       1    AB        Living   2         SAB        IAB        Ectopic        Multiple   0    Live Births   2           Obstetric Comments    C/S--35 weeks--IUGR, failed IOL                 /80   Pulse 85   Wt 56.2 kg (124 lb)   LMP 2025   BMI 21.28 kg/m²   Wt Readings from Last 3 Encounters:   25 56.2 kg (124 lb)   24 54 kg (119 lb)   24 55.6 kg (122 lb 8 oz)          ROS:  Review of Systems   Constitutional: Negative.    HENT: Negative.     Respiratory: Negative.     Cardiovascular: Negative.    Genitourinary:  Positive for menstrual problem. Negative for dysmenorrhea and dysuria.   Integumentary:  Negative.   Neurological:  Negative for headaches.   Psychiatric/Behavioral: Negative.     Breast: negative.           PHYSICAL EXAM:  Physical Exam  Vitals reviewed. Exam conducted with a chaperone present.   Constitutional:       Appearance: Normal appearance.   HENT:      Head: Normocephalic.   Eyes:      Extraocular Movements: Extraocular movements intact.   Cardiovascular:      Rate and Rhythm: Normal rate.       Pulses: Normal pulses.   Pulmonary:      Effort: Pulmonary effort is normal.   Abdominal:      General: Abdomen is flat.      Palpations: Abdomen is soft.   Musculoskeletal:         General: Normal range of motion.      Cervical back: Normal range of motion.   Skin:     General: Skin is warm and dry.   Neurological:      Mental Status: She is alert and oriented to person, place, and time.   Psychiatric:         Mood and Affect: Mood normal.         Behavior: Behavior normal.         Thought Content: Thought content normal.         Judgment: Judgment normal.          Assessment:  Dilia was seen today for contraception.    Diagnoses and all orders for this visit:    Urine pregnancy test negative  -     medroxyPROGESTERone (DEPO-PROVERA) injection 150 mg    Encounter for initial prescription of injectable contraceptive  -     POCT urine pregnancy          ICD-10-CM ICD-9-CM    1. Urine pregnancy test negative  Z32.02 V72.41 medroxyPROGESTERone (DEPO-PROVERA) injection 150 mg      2. Encounter for initial prescription of injectable contraceptive  Z30.013 V25.02 POCT urine pregnancy          Plan: Give Depo Provera  ACHES discussed  Return one week for gyn exam and pap      Follow up in about 1 week (around 6/30/2025), or for pap and 3 months for Depo..

## (undated) DEVICE — SOL SALINE STER BOTTLE 500ML

## (undated) DEVICE — SUT MONO 1 OBGYN 36IN CT1

## (undated) DEVICE — SUT 0 60IN PDS II VIO MONO

## (undated) DEVICE — PACK C SECTION RUSH

## (undated) DEVICE — SUT CHROMIC 2-0 CT1 36IN BR

## (undated) DEVICE — ADHESIVE DERMABOND ADVANCED

## (undated) DEVICE — SUT PLAIN GUT 2-0

## (undated) DEVICE — GLOVE PROTEXIS PI SYN SURG 6.5

## (undated) DEVICE — Device

## (undated) DEVICE — SUT MONOCRYL 3-0 KS UND MON

## (undated) DEVICE — APPLICATOR CHLORAPREP ORN 26ML

## (undated) DEVICE — KIT PROVENT ABG LL 23G 1IN 3CC